# Patient Record
Sex: MALE | Race: BLACK OR AFRICAN AMERICAN | URBAN - METROPOLITAN AREA
[De-identification: names, ages, dates, MRNs, and addresses within clinical notes are randomized per-mention and may not be internally consistent; named-entity substitution may affect disease eponyms.]

---

## 2017-01-02 ENCOUNTER — OFFICE VISIT (OUTPATIENT)
Dept: FAMILY MEDICINE | Facility: CLINIC | Age: 47
End: 2017-01-02
Payer: COMMERCIAL

## 2017-01-02 VITALS
RESPIRATION RATE: 16 BRPM | DIASTOLIC BLOOD PRESSURE: 62 MMHG | SYSTOLIC BLOOD PRESSURE: 102 MMHG | OXYGEN SATURATION: 99 % | TEMPERATURE: 97.1 F | WEIGHT: 181.8 LBS | HEART RATE: 71 BPM | HEIGHT: 69 IN | BODY MASS INDEX: 26.93 KG/M2

## 2017-01-02 DIAGNOSIS — K21.9 GASTROESOPHAGEAL REFLUX DISEASE WITHOUT ESOPHAGITIS: ICD-10-CM

## 2017-01-02 DIAGNOSIS — A04.8 H. PYLORI INFECTION: Primary | ICD-10-CM

## 2017-01-02 PROCEDURE — 99214 OFFICE O/P EST MOD 30 MIN: CPT | Performed by: FAMILY MEDICINE

## 2017-01-02 RX ORDER — AMOXICILLIN 500 MG/1
1000 CAPSULE ORAL 2 TIMES DAILY
Qty: 60 CAPSULE | Refills: 0 | Status: SHIPPED | OUTPATIENT
Start: 2017-01-02 | End: 2018-01-26

## 2017-01-02 RX ORDER — METRONIDAZOLE 500 MG/1
500 TABLET ORAL 2 TIMES DAILY
Qty: 30 TABLET | Refills: 0 | Status: SHIPPED | OUTPATIENT
Start: 2017-01-02

## 2017-01-02 NOTE — PATIENT INSTRUCTIONS
(A04.8) H. pylori infection  (primary encounter diagnosis)  Comment:    Plan: amoxicillin (AMOXIL) 500 MG capsule,   One gram po twice daily         metroNIDAZOLE (FLAGYL) 500 MG tablet one twice daily   TREATMENT PROGNOSIS BENEFITS AND RISKS DISCUSSED   SIDE EFFECTS BENEFITS AND RISKS DISCUSSED    MEDICATION RISKS SIDE EFFECTS BENEFITS AND RISKS DISCUSSED   ANOREXIA NAUSEA VOMITING OR DIARRHEA  AND HIVES   SEVERE STOMACH PAINS              (K21.9) Gastroesophageal reflux disease without esophagitis  Comment:    Plan: omeprazole (PRILOSEC) 20 MG CR capsule,   TWICE DAILY         metroNIDAZOLE (FLAGYL) 500 MG tablet  MAY GO BACK TO RANITIDINE AFTER TREATMENT    ,THAT IS ZANTAC   KIT MONTANEZ JR., MD

## 2017-01-02 NOTE — MR AVS SNAPSHOT
After Visit Summary   1/2/2017    Lai Sims    MRN: [id number]           Patient Information     Date Of Birth          1970        Visit Information        Provider Department      1/2/2017 9:00 AM Kit Montanez MD; [employer] [facility]        Today's Diagnoses     H. pylori infection    -  1     Gastroesophageal reflux disease without esophagitis           Care Instructions    (A04.8) H. pylori infection  (primary encounter diagnosis)  Comment:    Plan: amoxicillin (AMOXIL) 500 MG capsule,   One gram po twice daily         metroNIDAZOLE (FLAGYL) 500 MG tablet one twice daily   TREATMENT PROGNOSIS BENEFITS AND RISKS DISCUSSED   SIDE EFFECTS BENEFITS AND RISKS DISCUSSED    MEDICATION RISKS SIDE EFFECTS BENEFITS AND RISKS DISCUSSED   ANOREXIA NAUSEA VOMITING OR DIARRHEA  AND HIVES   SEVERE STOMACH PAINS              (K21.9) Gastroesophageal reflux disease without esophagitis  Comment:    Plan: omeprazole (PRILOSEC) 20 MG CR capsule,   TWICE DAILY         metroNIDAZOLE (FLAGYL) 500 MG tablet  MAY GO BACK TO RANITIDINE AFTER TREATMENT    ,THAT IS ZANTAC   KIT MONTANEZ JR., MD                        Follow-ups after your visit        Who to contact     If you have questions or need follow up information about today's clinic visit or your schedule please contact [facility] directly at [phone].  Normal or non-critical lab and imaging results will be communicated to you by MyChart, letter or phone within 4 business days after the clinic has received the results. If you do not hear from us within 7 days, please contact the clinic through MyChart or phone. If you have a critical or abnormal lab result, we will notify you by phone as soon as possible.  Submit refill requests through [employer] or call your pharmacy and they will forward the refill request to us. Please allow 3 business days for  "your refill to be completed.          Additional Information About Your Visit        [employer] Information     [employer] lets you send messages to your doctor, view your test results, renew your prescriptions, schedule appointments and more. To sign up, go to [email or web address] . Click on \"Log in\" on the left side of the screen, which will take you to the Welcome page. Then click on \"Sign up Now\" on the right side of the page.     You will be asked to enter the access code listed below, as well as some personal information. Please follow the directions to create your username and password.     Your access code is: [email or web address]  Expires: 2017  9:35 AM     Your access code will  in 90 days. If you need help or a new code, please call your [place] clinic or [phone].        Your Vitals Were     Pulse Temperature Respirations Height BMI (Body Mass Index) Pulse Oximetry    71 97.1  F (36.2  C) (Tympanic) 16 5' 9\" (1.753 m) 26.83 kg/m2 99%       Blood Pressure from Last 3 Encounters:   17 102/62   16 108/64   16 108/62    Weight from Last 3 Encounters:   17 181 lb 12.8 oz (82.464 kg)   16 185 lb 6.4 oz (84.097 kg)   16 177 lb 9.6 oz (80.559 kg)              Today, you had the following     No orders found for display         Today's Medication Changes          These changes are accurate as of: 17  9:35 AM.  If you have any questions, ask your nurse or doctor.               Start taking these medicines.        Dose/Directions    amoxicillin 500 MG capsule   Commonly known as:  AMOXIL   Used for:  H. pylori infection   Started by:  Will Patterson MD        Dose:  1000 mg   Take 2 capsules (1,000 mg) by mouth 2 times daily   Quantity:  60 capsule   Refills:  0       metroNIDAZOLE 500 MG tablet   Commonly known as:  FLAGYL   Used for:  H. pylori infection, Gastroesophageal reflux disease without esophagitis   Started by:  Will Patterson MD        " Dose:  500 mg   Take 1 tablet (500 mg) by mouth 2 times daily   Quantity:  30 tablet   Refills:  0         These medicines have changed or have updated prescriptions.        Dose/Directions    omeprazole 20 MG CR capsule   Commonly known as:  priLOSEC   This may have changed:  when to take this   Used for:  Gastroesophageal reflux disease without esophagitis   Changed by:  Will Patetrson MD        Dose:  20 mg   Take 1 capsule (20 mg) by mouth 2 times daily   Quantity:  30 capsule   Refills:  0            Where to get your medicines      These medications were sent to [id number] - [place], [place] - [address], [place] [address]     Phone:  [phone]    - amoxicillin 500 MG capsule  - metroNIDAZOLE 500 MG tablet  - omeprazole 20 MG CR capsule             Primary Care Provider    None Specified       No primary provider on file.        Thank you!     Thank you for choosing [facility]  for your care. Our goal is always to provide you with excellent care. Hearing back from our patients is one way we can continue to improve our services. Please take a few minutes to complete the written survey that you may receive in the mail after your visit with us. Thank you!             Your Updated Medication List - Protect others around you: Learn how to safely use, store and throw away your medicines at www.disposemymeds.org.          This list is accurate as of: 1/2/17  9:35 AM.  Always use your most recent med list.                   Brand Name Dispense Instructions for use    amoxicillin 500 MG capsule    AMOXIL    60 capsule    Take 2 capsules (1,000 mg) by mouth 2 times daily       cetirizine 10 MG tablet    zyrTEC    30 tablet    Take 1 tablet (10 mg) by mouth every evening       fluocinonide 0.05 % topical gel    LIDEX    60 g    Apply sparingly to affected area twice daily as needed.  Do not apply to face.       metroNIDAZOLE 500 MG  tablet    FLAGYL    30 tablet    Take 1 tablet (500 mg) by mouth 2 times daily       mineral oil-white petrolatum Crea cream     454 g    Apply topically 2 times daily       omeprazole 20 MG CR capsule    priLOSEC    30 capsule    Take 1 capsule (20 mg) by mouth 2 times daily

## 2017-01-02 NOTE — PROGRESS NOTES
SUBJECTIVE:                                                    Lai Sims is a 46 year old male who presents to clinic today for the following health issues:  Health Maintenance Due   Topic Date Due     TETANUS IMMUNIZATION (SYSTEM ASSIGNED)  09/21/1987     INFLUENZA VACCINE (SYSTEM ASSIGNED)  09/01/2016     Health Maintenance reviewed at today's visit patient asked to schedule/complete:   Immunizations:  Patient agrees to schedule    GERD/Heartburn      Duration: couple months    Description (location/character/radiation): daily, burning in chest    Intensity:  moderate    Accompanying signs and symptoms:  food getting stuck: no   nausea/vomiting/blood: YES, nausea  abdominal pain: YES  black/tarry or bloody stools: no, diarrhea for 1 week    History (similar episodes/previous evaluation): yes    Precipitating or alleviating factors:  worse with no particular food or drink.  current NSAID/Aspirin use: no     Therapies tried and outcome: Omeprazole (Prilosec), not helping the last couple weeks     .  Diabetes Follow-up      Patient is checking blood sugars:  Occasional     Diabetic concerns: None     Symptoms of hypoglycemia (low blood sugar): none     Paresthesias (numbness or burning in feet) or sores: No     Date of last diabetic eye exam:  YEARLY RECOMMENDED      Hyperlipidemia Follow-Up      Rate your low fat/cholesterol diet?: good    Taking statin?  Yes, possible muscle aches from statin    Other lipid medications/supplements?:  none     Hypertension Follow-up      Outpatient blood pressures are not being checked.    Low Salt Diet: no added salt         Amount of exercise or physical activity: None    Problems taking medications regularly: No    Medication side effects: no muscle aches  Diet: low salt, low fat/cholesterol and diabetic     .  Current Outpatient Prescriptions   Medication Sig Dispense Refill     amoxicillin (AMOXIL) 500 MG capsule Take 2 capsules (1,000 mg) by mouth 2 times daily 60  "capsule 0     omeprazole (PRILOSEC) 20 MG CR capsule Take 1 capsule (20 mg) by mouth 2 times daily 30 capsule 0     metroNIDAZOLE (FLAGYL) 500 MG tablet Take 1 tablet (500 mg) by mouth 2 times daily 30 tablet 0     fluocinonide (LIDEX) 0.05 % gel Apply sparingly to affected area twice daily as needed.  Do not apply to face. 60 g 0     mineral oil-white petrolatum (MINERIN, EUCERIN) CREA Apply topically 2 times daily 454 g 0     cetirizine (ZYRTEC) 10 MG tablet Take 1 tablet (10 mg) by mouth every evening 30 tablet 1        No Known Allergies    Immunization History   Administered Date(s) Administered     Hepatitis B 05/15/2014     MMR 2014, 2014     Pneumococcal 23 valent 2014     TDAP (BOOSTRIX AGES 10-64) 2014, 2014, 2015     Varicella 2014, 2014         reports that he does not drink alcohol.      reports that he does not use illicit drugs.    family history includes DIABETES in his father; Hypertension in his mother; Liver Disease in his father.    indicated that his mother is . He indicated that his father is .      has no past surgical history on file.     reports that he currently engages in sexual activity and has had female partners.  .  Pediatric History   Patient Guardian Status     Not on file.     Other Topics Concern     Parent/Sibling W/ Cabg, Mi Or Angioplasty Before 65f 55m? No     Social History Narrative         reports that he has quit smoking. His smoking use included Cigarettes. He does not have any smokeless tobacco history on file.    Medical, social, surgical, and family histories reviewed.    /62 mmHg  Pulse 71  Temp(Src) 97.1  F (36.2  C) (Tympanic)  Resp 16  Ht 5' 9\" (1.753 m)  Wt 181 lb 12.8 oz (82.464 kg)  BMI 26.83 kg/m2  SpO2 99%    Body mass index is 26.83 kg/(m^2).      Problem list, Medication list, Allergies, and Medical/Social/Surgical histories reviewed in [facility] and updated as appropriate.  Labs reviewed " in EPIC  Patient Active Problem List   Diagnosis     Scleral hemorrhage, left     Family history of diabetes mellitus     CARDIOVASCULAR SCREENING; LDL GOAL LESS THAN 100     Nonspecific abnormal results of liver function study     H. pylori infection     History reviewed. No pertinent past surgical history.    Social History   Substance Use Topics     Smoking status: Former Smoker     Types: Cigarettes     Smokeless tobacco: Not on file     Alcohol Use: No     Family History   Problem Relation Age of Onset     Hypertension Mother      DIABETES Father      Liver Disease Father          No Known Allergies  Recent Labs   Lab Test  09/19/16   1652   LDL  94   HDL  38*   TRIG  191*   ALT  75*        BP Readings from Last 6 Encounters:   01/02/17 102/62   11/29/16 108/64   09/19/16 108/62       Wt Readings from Last 3 Encounters:   01/02/17 181 lb 12.8 oz (82.464 kg)   11/29/16 185 lb 6.4 oz (84.097 kg)   09/19/16 177 lb 9.6 oz (80.559 kg)         Positive symptoms or findings indicated by bold designation:     ROS: 10 point ROS neg other than the symptoms noted above in the HPI.except  has Scleral hemorrhage, left; Family history of diabetes mellitus; CARDIOVASCULAR SCREENING; LDL GOAL LESS THAN 100; Nonspecific abnormal results of liver function study; and H. pylori infection on his problem list.   Constitutional: The patient denied fatigue, fever, insomnia, night sweats, recent illness and weight loss.  WEIGHT IS STABLE     Eyes: The patient denied blindness, eye pain, eye tearing, photophobia, vision change and visual disturbance. NORMAL VISION     Ears/Nose/Throat/Neck: The patient denied dizziness, facial pain, hearing loss, nasal discharge, oral pain, otalgia, postnasal drip, sinus congestion, sore throat, tinnitus and voice change.   NORMAL HEARING AND BALANCE     Cardiovascular: The patient denied arrhythmia, chest pain/pressure, claudication, edema, exercise intolerance, fatigue, orthopnea, palpitations and  "syncope.  ASYMPTOMATIC     Respiratory: The patient denied asthma, chest congestion, cough, dyspnea on exertion, dyspnea/shortness of breath, hemoptysis, pedal edema, pleuritic pain, productive sputum, snoring and wheezing. NORMAL     Gastrointestinal: The patient denied abdominal pain, anorexia, constipation, diarrhea, dysphagia, gastroesophageal reflux, hematochezia, hemorrhoids, melena, nausea and vomiting . GASTROESOPHAGEAL REFLUX DISEASE WITHOUT CONTROL, ON ZANTAC   EPIGASTRIC AND LEFT UPPER QUADRANT PAIN   POSITIVE H PYLORI TEST NO TREATMENT YET   DID NOT RECEIVE LETTER ACCORDING TO PATIENT     Genitourinary/Nephrology: The patient denied breast complaint, dysuria, nocturia sexual dysfunction, t, urinary frequency, urinary incontinence, urinary urgency    BENIGN PROSTATIC HYPERTROPHY SYMPTOMS NOCTURIA X 2     Musculoskeletal: The patient denied arthralgia(s), back pain, joint complaint, muscle weakness, myalgias, osteoporosis, sciatica, stiffness and swelling.  OSTEOARTHRITIS KNEES AND INTERMITTEN LOWER BACK PAIN     Dermatoligic:: The patient denied acne, dermatitis, ecchymosis, itching, mole change, rash, skin cancer, skin lesion and sores.      Neurologic: The patient denied dizziness, gait abnormality, headache, memory loss, mental status change, paresis, paresthesia, seizure, syncope, tremor and vision change.     Psychiatric: The patient denied anxiety, depression, disturbances of memory, drug abuse, insomnia, mood swings and relationship difficulties.      Endocrine: The patient denied , goiter, obesity, polyuria and thyroid disease.  DIABETES 2 GOAL 8% LDL OR \"BAD\" CHOLESTEROL  GOAL < 100      Hematologic/Lymphatic: The patient denied abnormal bleeding and bruising, abnormal ecchymoses, anemia, lymph node enlargement/mass, petechiae and venous  Thrombosis.      Allergy/Immunology: The patient denied food allergy and  Allergic rhinitis or conjunctivitis.        PE:  /62 mmHg  Pulse 71  Temp(Src) " "97.1  F (36.2  C) (Tympanic)  Resp 16  Ht 5' 9\" (1.753 m)  Wt 181 lb 12.8 oz (82.464 kg)  BMI 26.83 kg/m2  SpO2 99% Body mass index is 26.83 kg/(m^2).    Constitutional: general appearance, well nourished, well developed, in no acute distress, well developed, appears stated age, normal body habitus,      Eyes:; The patient has normal eyelids sclerae and conjunctivae :      Ears/Nose/Throat: external ear, overall: normal appearance; external nose, overall: benign appearance, normal moujth gums and lips  The patient has:  WITHIN NORMAL LIMITS     Neck: thyroid, overall: normal size, normal consistency, nontender,      Respiratory:  palpation of chest, overall: normal excursion,    Clear to percussion and auscultation      Tachypnea   Color      Cardiovascular:  Good color with no peripheral edema    Regular sinus rhythm without murmur. Physiologic heart sounds Heart is unelarged  .   Chest/Breast: normal shape       Abdominal exam,  Liver and spleen are  unenlarged        Tenderness  NORMAL   Scars  NORMAL     Urogenital; no renal, flank or bladder  tenderness;      Lymphatic: neck nodes,     Other notes     Musculoskeletal:  Brief ortho exam normal except:   NORMAL EXCEPT  OSTEOARTHRITIS KNEES    Integument: inspection of skin, no rash, lesions; and, palpation, no induration, no tenderness.      Neurologic mental status, overall: alert and oriented; gait, no ataxia, no unsteadiness; coordination, no tremors; cranial nerves, overall: normal motor, overall: normal bulk, tone.      Psychiatric: orientation/consciousness, overall: oriented to person, place and time; behavior/psychomotor activity, no tics, normal psychomotor activity; mood and affect, overall: normal mood and affect; appearance, overall: well-groomed, good eye contact; speech, overall: normal quality, no aphasia and normal quality, quantity, intact.        ICD-10-CM    1. H. pylori infection A04.8 amoxicillin (AMOXIL) 500 MG capsule     metroNIDAZOLE " (FLAGYL) 500 MG tablet   2. Gastroesophageal reflux disease without esophagitis K21.9 omeprazole (PRILOSEC) 20 MG CR capsule     metroNIDAZOLE (FLAGYL) 500 MG tablet        .    Side effects benefits and risks thoroughly discussed. .he may come in early if unimproved or getting worse          Importance of adhering to regimen discussed and if medications were dispensed, the importance of taking medications discussed and bringing in the medications after every visit for chronic problems         Please drink 2 glasses of water prior to meals and walk 15-30 minutes after meals    I spent  25 MINUTES SPENT  with patient discussing the following issues    The primary encounter diagnosis was H. pylori infection. A diagnosis of Gastroesophageal reflux disease without esophagitis was also pertinent to this visit. over half of which involved counseling and coordination of care.    Patient Instructions   (A04.8) H. pylori infection  (primary encounter diagnosis)  Comment:    Plan: amoxicillin (AMOXIL) 500 MG capsule,   One gram po twice daily         metroNIDAZOLE (FLAGYL) 500 MG tablet one twice daily   TREATMENT PROGNOSIS BENEFITS AND RISKS DISCUSSED   SIDE EFFECTS BENEFITS AND RISKS DISCUSSED    MEDICATION RISKS SIDE EFFECTS BENEFITS AND RISKS DISCUSSED   ANOREXIA NAUSEA VOMITING OR DIARRHEA  AND HIVES   SEVERE STOMACH PAINS              (K21.9) Gastroesophageal reflux disease without esophagitis  Comment:    Plan: omeprazole (PRILOSEC) 20 MG CR capsule,   TWICE DAILY         metroNIDAZOLE (FLAGYL) 500 MG tablet  MAY GO BACK TO RANITIDINE AFTER TREATMENT    ,THAT IS ZANTAC   KIT MONTANEZ JR., MD                      Diet:  MEDITERRANEAN DIET AND DIABETES      Exercise:  RANGE OF MOTION KNEES AND STRENGTHENING AND AEROBIC WITH MEALS  ,THAT IS WALKING   Exercises Range of motion, balance, isometric, and strengthening exercises 30 repetitions twice daily of involved joints      .KIT MONTANEZ MD 1/2/2017 10:29 AM   January 2, 2017

## 2017-01-02 NOTE — NURSING NOTE
"Chief Complaint   Patient presents with     Gastrophageal Reflux       Initial /62 mmHg  Pulse 71  Temp(Src) 97.1  F (36.2  C) (Tympanic)  Resp 16  Ht 5' 9\" (1.753 m)  Wt 181 lb 12.8 oz (82.464 kg)  BMI 26.83 kg/m2  SpO2 99% Estimated body mass index is 26.83 kg/(m^2) as calculated from the following:    Height as of this encounter: 5' 9\" (1.753 m).    Weight as of this encounter: 181 lb 12.8 oz (82.464 kg).  BP completed using cuff size: naty Samuel CMA      "

## 2017-02-06 DIAGNOSIS — A04.8 H. PYLORI INFECTION: Primary | ICD-10-CM

## 2017-02-06 DIAGNOSIS — K21.9 GASTROESOPHAGEAL REFLUX DISEASE WITHOUT ESOPHAGITIS: ICD-10-CM

## 2017-02-06 NOTE — TELEPHONE ENCOUNTER
metroNIDAZOLE (FLAGYL) 500 MG tablet      Last Written Prescription Date: 1/2/17  Last Fill Quantity: 30,  # refills: 0   Last Office Visit with [facility], [facility] or [facility] prescribing provider: 1/2/17

## 2017-02-09 RX ORDER — METRONIDAZOLE 500 MG/1
500 TABLET ORAL 2 TIMES DAILY
Qty: 30 TABLET | Refills: 0 | OUTPATIENT
Start: 2017-02-09

## 2017-02-10 DIAGNOSIS — A04.8 H. PYLORI INFECTION: Primary | ICD-10-CM

## 2017-02-10 NOTE — TELEPHONE ENCOUNTER
amoxicillin (AMOXIL) 500 MG capsule      Last Written Prescription Date:  1/2/17  Last Fill Quantity: 60,   # refills: 0  Last Office Visit with [facility], [facility] or [facility] prescribing provider: 1/2/17  Future Office visit:       Routing refill request to provider for review/approval because:  Drug not on the [facility], [facility] or [facility] refill protocol or controlled substance

## 2017-02-14 RX ORDER — AMOXICILLIN 500 MG/1
1000 CAPSULE ORAL 2 TIMES DAILY
Qty: 60 CAPSULE | Refills: 0 | OUTPATIENT
Start: 2017-02-14

## 2017-02-15 NOTE — TELEPHONE ENCOUNTER
Pharmacy called requesting status of refill request.   Tech was informed of providers message below.   Pt was called and message was left asking he call clinic for appt for Rx.

## 2017-06-20 ENCOUNTER — OFFICE VISIT (OUTPATIENT)
Dept: FAMILY MEDICINE | Facility: CLINIC | Age: 47
End: 2017-06-20
Payer: COMMERCIAL

## 2017-06-20 VITALS
SYSTOLIC BLOOD PRESSURE: 106 MMHG | DIASTOLIC BLOOD PRESSURE: 64 MMHG | OXYGEN SATURATION: 99 % | HEIGHT: 69 IN | WEIGHT: 178.5 LBS | HEART RATE: 68 BPM | BODY MASS INDEX: 26.44 KG/M2 | RESPIRATION RATE: 16 BRPM | TEMPERATURE: 97.5 F

## 2017-06-20 DIAGNOSIS — L28.0 LICHEN SIMPLEX CHRONICUS: ICD-10-CM

## 2017-06-20 DIAGNOSIS — A04.8 H. PYLORI INFECTION: Primary | ICD-10-CM

## 2017-06-20 PROCEDURE — 99213 OFFICE O/P EST LOW 20 MIN: CPT | Performed by: FAMILY MEDICINE

## 2017-06-20 RX ORDER — FLUOCINONIDE GEL 0.5 MG/G
GEL TOPICAL
Qty: 60 G | Refills: 3 | Status: SHIPPED | OUTPATIENT
Start: 2017-06-20

## 2017-06-20 NOTE — PATIENT INSTRUCTIONS
(A04.8) H. pylori infection  (primary encounter diagnosis)  Comment:    Plan: H Pylori antigen, stool, omeprazole (PRILOSEC)         20 MG CR capsule  (A04.8) H. pylori infection  (primary encounter diagnosis)  Comment:    Plan: H Pylori antigen, stool, omeprazole (PRILOSEC)         20 MG CR capsule, DERMATOLOGY REFERRAL             (L28.0) Lichen simplex chronicus  Comment:    Plan: fluocinonide (LIDEX) 0.05 % topical gel,         DERMATOLOGY REFERRAL

## 2017-06-20 NOTE — PROGRESS NOTES
SUBJECTIVE:                                                    Lai Sims is a 46 year old male who presents to clinic today for the following health issues:      GERD/Heartburn      Duration: on going    Description (location/character/radiation): mid chest through esophagis        Intensity:  moderate    Accompanying signs and symptoms:  food getting stuck: YES  nausea/vomiting/blood: YES  abdominal pain: no   black/tarry or bloody stools: YES: black and mucas    History (similar episodes/previous evaluation): ongoing    Precipitating or alleviating factors:  worse with bananas and cold food.  current NSAID/Aspirin use: no     Therapies tried and outcome: none     SCLERAL HEMORRHAGE HAS RESOLVED   HISTORY OF ABNORMAL LIVER FUNCTION TESTS   HISTORY OF H PYLORI INFECTIPON  LICHEN SIMPLEX CHRONICUS SYMPTOMS RECURRED WHEN RAN OUT OF MEDICATIONS   WANTS TO SEE A DERMATOLOGIST         .  Current Outpatient Prescriptions   Medication Sig Dispense Refill     omeprazole (PRILOSEC) 20 MG CR capsule Take 1 capsule (20 mg) by mouth daily 30 capsule 11     fluocinonide (LIDEX) 0.05 % topical gel Apply sparingly to affected area twice daily as needed.  Do not apply to face. 60 g 3     amoxicillin (AMOXIL) 500 MG capsule Take 2 capsules (1,000 mg) by mouth 2 times daily 60 capsule 0     omeprazole (PRILOSEC) 20 MG CR capsule Take 1 capsule (20 mg) by mouth 2 times daily 30 capsule 0     metroNIDAZOLE (FLAGYL) 500 MG tablet Take 1 tablet (500 mg) by mouth 2 times daily 30 tablet 0     mineral oil-white petrolatum (MINERIN, EUCERIN) CREA Apply topically 2 times daily 454 g 0     cetirizine (ZYRTEC) 10 MG tablet Take 1 tablet (10 mg) by mouth every evening 30 tablet 1        No Known Allergies    Immunization History   Administered Date(s) Administered     Hepatitis B 05/15/2014     MMR 03/20/2014, 05/02/2014     Pneumococcal 23 valent 03/20/2014     TDAP Vaccine (Boostrix) 03/20/2014, 05/02/2014, 02/12/2015     Varicella  2014, 2014         reports that he does not drink alcohol.      reports that he does not use illicit drugs.    family history includes DIABETES in his father; Hypertension in his mother; Liver Disease in his father.    indicated that his mother is . He indicated that his father is .      has no past surgical history on file.     reports that he currently engages in sexual activity and has had female partners.  .  Pediatric History   Patient Guardian Status     Not on file.     Other Topics Concern     Parent/Sibling W/ Cabg, Mi Or Angioplasty Before 65f 55m? No     Social History Narrative         reports that he has quit smoking. His smoking use included Cigarettes. He does not have any smokeless tobacco history on file.    Medical, social, surgical, and family histories reviewed.    Problem list, Medication list, Allergies, and Medical/Social/Surgical histories reviewed in [facility] and updated as appropriate.  Labs reviewed in [facility]  Patient Active Problem List   Diagnosis     Scleral hemorrhage, left     Family history of diabetes mellitus     CARDIOVASCULAR SCREENING; LDL GOAL LESS THAN 100     Nonspecific abnormal results of liver function study     H. pylori infection     History reviewed. No pertinent surgical history.    Social History   Substance Use Topics     Smoking status: Former Smoker     Types: Cigarettes     Smokeless tobacco: Not on file     Alcohol use No     Family History   Problem Relation Age of Onset     Hypertension Mother      DIABETES Father      Liver Disease Father          No Known Allergies  Recent Labs   Lab Test  16   1652   LDL  94   HDL  38*   TRIG  191*   ALT  75*        BP Readings from Last 6 Encounters:   17 106/64   17 102/62   16 108/64   16 108/62       Wt Readings from Last 3 Encounters:   17 178 lb 8 oz (81 kg)   17 181 lb 12.8 oz (82.5 kg)   16 185 lb 6.4 oz (84.1 kg)         Positive symptoms or  findings indicated by bold designation:     ROS: 10 point ROS neg other than the symptoms noted above in the HPI.except  has Scleral hemorrhage, left; Family history of diabetes mellitus; CARDIOVASCULAR SCREENING; LDL GOAL LESS THAN 100; Nonspecific abnormal results of liver function study; and H. pylori infection on his problem list.   Constitutional: The patient denied fatigue, fever, insomnia, night sweats, recent illness and weight loss.  WEIGHT STABLE     Eyes: The patient denied blindness, eye pain, eye tearing, photophobia, vision change and visual disturbance. NORMAL       Ears/Nose/Throat/Neck: The patient denied dizziness, facial pain, hearing loss, nasal discharge, oral pain, otalgia, postnasal drip, sinus congestion, sore throat, tinnitus and voice change.   NORMAL     Cardiovascular: The patient denied arrhythmia, chest pain/pressure, claudication, edema, exercise intolerance, fatigue, orthopnea, palpitations and syncope.  NORMAL     Respiratory: The patient denied asthma, chest congestion, cough, dyspnea on exertion, dyspnea/shortness of breath, hemoptysis, pedal edema, pleuritic pain, productive sputum, snoring and wheezing. NORMAL     Gastrointestinal: SEE HISTORY OF PRESENT ILLNESS     Genitourinary/Nephrology: The patient denied breast complaint, dysuria, nocturia sexual dysfunction, t, urinary frequency, urinary incontinence, urinary urgency    NORMAL     Musculoskeletal: The patient denied arthralgia(s), back pain, joint complaint, muscle weakness, myalgias, osteoporosis, sciatica, stiffness and swelling.  NORMAL     Dermatoligic:: The patient denied acne, dermatitis, ecchymosis, itching, mole change, rash, skin cancer, skin lesion and sores.  ITCHY RASH WITH PLAQUES LOWER EXTREMITY BILATERAL      Neurologic: The patient denied dizziness, gait abnormality, headache, memory loss, mental status change, paresis, paresthesia, seizure, syncope, tremor and vision change. NORMAL     Psychiatric: The  "patient denied anxiety, depression, disturbances of memory, drug abuse, insomnia, mood swings and relationship difficulties.  NORMAL     Endocrine: The patient denied , goiter, obesity, polyuria and thyroid disease.  NORMAL     Hematologic/Lymphatic: The patient denied abnormal bleeding and bruising, abnormal ecchymoses, anemia, lymph node enlargement/mass, petechiae and venous  Thrombosis.  NORMAL     Allergy/Immunology: The patient denied food allergy and  Allergic rhinitis or conjunctivitis.  NORMAL       PE:  /64  Pulse 68  Temp 97.5  F (36.4  C) (Tympanic)  Resp 16  Ht 5' 9\" (1.753 m)  Wt 178 lb 8 oz (81 kg)  SpO2 99%  BMI 26.36 kg/m2 Body mass index is 26.36 kg/(m^2).    Constitutional: general appearance, well nourished, well developed, in no acute distress, well developed, appears stated age, normal body habitus,      Eyes:; The patient has normal eyelids sclerae and conjunctivae : WITHIN NORMAL LIMITS      Ears/Nose/Throat: external ear, overall: normal appearance; external nose, overall: benign appearance, normal moujth gums and lips  The patient has:  NORMAL     Neck: thyroid, overall: normal size, normal consistency, nontender,  NORMAL     Respiratory:  palpation of chest, overall: normal excursion,  NORMAL   Clear to percussion and auscultation  NORMAL     Tachypnea  NORMAL  Color  NORMAL     Cardiovascular:  Good color with no peripheral edema  NORMAL   Regular sinus rhythm without murmur. Physiologic heart sounds Heart is unelarged  .   Chest/Breast: normal shape  NORMAL      Abdominal exam,  Liver and spleen are  unenlarged        Tenderness  NORMAL   Scars NORMAL     Urogenital; no renal, flank or bladder  tenderness;  NORMAL     Lymphatic: neck nodes,  NORMAL    Other nodes NOT APPLICABLE     Musculoskeletal:  Brief ortho exam normal except:  WITHIN NORMAL LIMITS  UPPER EXTREMETIES AND LOWER EXTREMITY BILATERAL     Integument: inspection of skin, no rash, lesions; and, palpation, no " induration, no tenderness.  NEURODERMATITIS LOWER EXTREMITY NOTED    Neurologic mental status, overall: alert and oriented; gait, no ataxia, no unsteadiness; coordination, no tremors; cranial nerves, overall: normal motor, overall: normal bulk, tone.  NORMAL     Psychiatric: orientation/consciousness, overall: oriented to person, place and time; behavior/psychomotor activity, no tics, normal psychomotor activity; mood and affect, overall: normal mood and affect; appearance, overall: well-groomed, good eye contact; speech, overall: normal quality, no aphasia and normal quality, quantity, intact.  NORMAL     Diagnostic Test Results:  Results for orders placed or performed in visit on 11/30/16   H Pylori antigen, stool   Result Value Ref Range    Specimen Description Feces     H Pylori Antigen (A)      Positive for Helicobacter pylori antigen by enzyme immunoassay. A positive   result indicates the presence of H. pylori antigen.      Micro Report Status FINAL 12/02/2016          ICD-10-CM    1. H. pylori infection A04.8 H Pylori antigen, stool     omeprazole (PRILOSEC) 20 MG CR capsule     DERMATOLOGY REFERRAL   2. Lichen simplex chronicus L28.0 fluocinonide (LIDEX) 0.05 % topical gel     DERMATOLOGY REFERRAL        .    Side effects benefits and risks thoroughly discussed. .he may come in early if unimproved or getting worse          Importance of adhering to regimen discussed and if medications were dispensed, the importance of taking medications discussed and bringing in the medications after every visit for chronic problems         Please drink 2 glasses of water prior to meals and walk 15-30 minutes after meals    I spent 15 MINUTES   with patient discussing the following issues   The primary encounter diagnosis was H. pylori infection. A diagnosis of Lichen simplex chronicus was also pertinent to this visit. over half of which involved counseling and coordination of care.    Patient Instructions   (A04.8) H.  pylori infection  (primary encounter diagnosis)  Comment:    Plan: H Pylori antigen, stool, omeprazole (PRILOSEC)         20 MG CR capsule  (A04.8) H. pylori infection  (primary encounter diagnosis)  Comment:    Plan: H Pylori antigen, stool, omeprazole (PRILOSEC)         20 MG CR capsule, DERMATOLOGY REFERRAL             (L28.0) Lichen simplex chronicus  Comment:    Plan: fluocinonide (LIDEX) 0.05 % topical gel,         DERMATOLOGY REFERRAL                                     ALL THE ABOVE PROBLEMS ARE STABLE AND MED CHANGES AS NOTED    Diet:  MEDITERRANEAN DIET    UP TO DATE INFORMATION GIVEN ON GASTROESOPHAGEAL REFLUX DISEASE AND H PYLORI GIVEN     Exercise:  NOT APPLICABLE   Exercises Range of motion, balance, isometric, and strengthening exercises 30 repetitions twice daily of involved joints      .KIT MONTANEZ MD 6/20/2017 11:46 AM  June 21, 2017

## 2017-06-20 NOTE — MR AVS SNAPSHOT
After Visit Summary   6/20/2017    Lai Sims    MRN: [id number]           Patient Information     Date Of Birth          1970        Visit Information        Provider Department      6/20/2017 2:00 PM Will Patterson MD; [employer] [facility]        Today's Diagnoses     H. pylori infection    -  1    Lichen simplex chronicus          Care Instructions    (A04.8) H. pylori infection  (primary encounter diagnosis)  Comment:    Plan: H Pylori antigen, stool, omeprazole (PRILOSEC)         20 MG CR capsule  (A04.8) H. pylori infection  (primary encounter diagnosis)  Comment:    Plan: H Pylori antigen, stool, omeprazole (PRILOSEC)         20 MG CR capsule, DERMATOLOGY REFERRAL             (L28.0) Lichen simplex chronicus  Comment:    Plan: fluocinonide (LIDEX) 0.05 % topical gel,         DERMATOLOGY REFERRAL                                       Follow-ups after your visit        Additional Services     DERMATOLOGY REFERRAL       Your provider has referred you to: [facility]. [facility] [phone]   [email or web address]  (A04.8) H. pylori infection  (primary encounter diagnosis)  Comment:     Plan: H Pylori antigen, stool, omeprazole (PRILOSEC)         20 MG CR capsule             (L28.0) Lichen simplex chronicus  Comment:    Plan: fluocinonide (LIDEX) 0.05 % topical gel         TWICE DAILY         Please be aware that coverage of these services is subject to the terms and limitations of your health insurance plan.  Call member services at your health plan with any benefit or coverage questions.      Please bring the following with you to your appointment:    (1) Any X-Rays, CTs or MRIs which have been performed.  Contact the facility where they were done to arrange for  prior to your scheduled appointment.    (2) List of current medications  (3) This referral request   (4) Any  "documents/labs given to you for this referral                  Future tests that were ordered for you today     Open Future Orders        Priority Expected Expires Ordered    H Pylori antigen, stool Routine  2017            Who to contact     If you have questions or need follow up information about today's clinic visit or your schedule please contact [facility] directly at [phone].  Normal or non-critical lab and imaging results will be communicated to you by MyChart, letter or phone within 4 business days after the clinic has received the results. If you do not hear from us within 7 days, please contact the clinic through [employer] or phone. If you have a critical or abnormal lab result, we will notify you by phone as soon as possible.  Submit refill requests through [employer] or call your pharmacy and they will forward the refill request to us. Please allow 3 business days for your refill to be completed.          Additional Information About Your Visit        [employer] Information     [employer] lets you send messages to your doctor, view your test results, renew your prescriptions, schedule appointments and more. To sign up, go to [email or web address] . Click on \"Log in\" on the left side of the screen, which will take you to the Welcome page. Then click on \"Sign up Now\" on the right side of the page.     You will be asked to enter the access code listed below, as well as some personal information. Please follow the directions to create your username and password.     Your access code is: [email or web address]  Expires: 2017  2:45 PM     Your access code will  in 90 days. If you need help or a new code, please call your [facility] clinic or [phone].        Care EveryWhere ID     This is your Care EveryWhere ID. This could be used by other organizations to access your [facility] medical records  [id number]        Your Vitals Were     Pulse Temperature " "Respirations Height Pulse Oximetry BMI (Body Mass Index)    68 97.5  F (36.4  C) (Tympanic) 16 5' 9\" (1.753 m) 99% 26.36 kg/m2       Blood Pressure from Last 3 Encounters:   06/20/17 106/64   01/02/17 102/62   11/29/16 108/64    Weight from Last 3 Encounters:   06/20/17 178 lb 8 oz (81 kg)   01/02/17 181 lb 12.8 oz (82.5 kg)   11/29/16 185 lb 6.4 oz (84.1 kg)              We Performed the Following     DERMATOLOGY REFERRAL          Today's Medication Changes          These changes are accurate as of: 6/20/17  2:45 PM.  If you have any questions, ask your nurse or doctor.               These medicines have changed or have updated prescriptions.        Dose/Directions    * omeprazole 20 MG CR capsule   Commonly known as:  priLOSEC   This may have changed:  Another medication with the same name was added. Make sure you understand how and when to take each.   Used for:  Gastroesophageal reflux disease without esophagitis   Changed by:  Will Patterson MD        Dose:  20 mg   Take 1 capsule (20 mg) by mouth 2 times daily   Quantity:  30 capsule   Refills:  0       * omeprazole 20 MG CR capsule   Commonly known as:  priLOSEC   This may have changed:  You were already taking a medication with the same name, and this prescription was added. Make sure you understand how and when to take each.   Used for:  H. pylori infection   Changed by:  Will Patterson MD        Dose:  20 mg   Take 1 capsule (20 mg) by mouth daily   Quantity:  30 capsule   Refills:  11       * Notice:  This list has 2 medication(s) that are the same as other medications prescribed for you. Read the directions carefully, and ask your doctor or other care provider to review them with you.         Where to get your medicines      These medications were sent to [facility]/pharmacy #[id number] - [place], [place] - [address]  [address], [place] [address]     Phone:  [phone]     fluocinonide 0.05 % topical gel    omeprazole 20 " MG CR capsule                Primary Care Provider    None Specified       No primary provider on file.        Thank you!     Thank you for choosing [facility]  for your care. Our goal is always to provide you with excellent care. Hearing back from our patients is one way we can continue to improve our services. Please take a few minutes to complete the written survey that you may receive in the mail after your visit with us. Thank you!             Your Updated Medication List - Protect others around you: Learn how to safely use, store and throw away your medicines at www.disposemymeds.org.          This list is accurate as of: 6/20/17  2:45 PM.  Always use your most recent med list.                   Brand Name Dispense Instructions for use    amoxicillin 500 MG capsule    AMOXIL    60 capsule    Take 2 capsules (1,000 mg) by mouth 2 times daily       cetirizine 10 MG tablet    zyrTEC    30 tablet    Take 1 tablet (10 mg) by mouth every evening       fluocinonide 0.05 % topical gel    LIDEX    60 g    Apply sparingly to affected area twice daily as needed.  Do not apply to face.       metroNIDAZOLE 500 MG tablet    FLAGYL    30 tablet    Take 1 tablet (500 mg) by mouth 2 times daily       mineral oil-white petrolatum Crea cream     454 g    Apply topically 2 times daily       * omeprazole 20 MG CR capsule    priLOSEC    30 capsule    Take 1 capsule (20 mg) by mouth 2 times daily       * omeprazole 20 MG CR capsule    priLOSEC    30 capsule    Take 1 capsule (20 mg) by mouth daily       * Notice:  This list has 2 medication(s) that are the same as other medications prescribed for you. Read the directions carefully, and ask your doctor or other care provider to review them with you.

## 2017-06-20 NOTE — NURSING NOTE
"Chief Complaint   Patient presents with     Abdominal Pain       Initial /64  Pulse 68  Temp 97.5  F (36.4  C) (Tympanic)  Resp 16  Ht 5' 9\" (1.753 m)  Wt 178 lb 8 oz (81 kg)  SpO2 99%  BMI 26.36 kg/m2 Estimated body mass index is 26.36 kg/(m^2) as calculated from the following:    Height as of this encounter: 5' 9\" (1.753 m).    Weight as of this encounter: 178 lb 8 oz (81 kg).  Medication Reconciliation: complete   .Pedro Pablo MEDEROS      "

## 2017-06-21 DIAGNOSIS — A04.8 H. PYLORI INFECTION: ICD-10-CM

## 2017-06-21 PROBLEM — L28.0 LICHEN SIMPLEX CHRONICUS: Status: ACTIVE | Noted: 2017-06-21

## 2017-06-21 PROCEDURE — 87338 HPYLORI STOOL AG IA: CPT | Performed by: FAMILY MEDICINE

## 2017-06-21 NOTE — LETTER
[address]  [address]  [facility] [address]  [phone]                                                                                                           Lai Sims   [address]  [address]  [place] [address]    June 22, 2017      Dear Lai,    The results of your recent tests were reviewed and are enclosed.     NORMAL FECAL COLORECTAL CANCER SCREEN    Results for orders placed or performed in visit on 06/21/17   H Pylori antigen, stool   Result Value Ref Range    Specimen Description Feces     H Pylori Antigen       Negative for Helicobacter pylori antigen by enzyme immunoassay. A negative   result indicates the absence of H. pylori antigen or that the level of antigen   is below the level of detection.      Micro Report Status FINAL 06/22/2017      Thank you for choosing [facility].  We appreciate the opportunity to serve you and look forward to supporting your healthcare needs in the future.    If you have any questions or concerns, please call me or my staff at [phone].      Sincerely,    Will Patterson Jr MD

## 2017-06-22 LAB
H PYLORI AG STL QL IA: NORMAL
MICRO REPORT STATUS: NORMAL
SPECIMEN SOURCE: NORMAL

## 2017-06-22 NOTE — PROGRESS NOTES
Please send normal lab letter when labs are complete  NORMAL FECAL COLORECTAL CANCER SCREEN   KIT MONTANEZ JR., MD

## 2018-01-26 ENCOUNTER — RADIANT APPOINTMENT (OUTPATIENT)
Dept: GENERAL RADIOLOGY | Facility: CLINIC | Age: 48
End: 2018-01-26
Attending: FAMILY MEDICINE
Payer: COMMERCIAL

## 2018-01-26 ENCOUNTER — OFFICE VISIT (OUTPATIENT)
Dept: FAMILY MEDICINE | Facility: CLINIC | Age: 48
End: 2018-01-26
Payer: COMMERCIAL

## 2018-01-26 ENCOUNTER — APPOINTMENT (OUTPATIENT)
Dept: LAB | Facility: CLINIC | Age: 48
End: 2018-01-26
Payer: COMMERCIAL

## 2018-01-26 VITALS
SYSTOLIC BLOOD PRESSURE: 116 MMHG | HEART RATE: 79 BPM | RESPIRATION RATE: 16 BRPM | DIASTOLIC BLOOD PRESSURE: 76 MMHG | TEMPERATURE: 98.4 F | WEIGHT: 191.5 LBS | OXYGEN SATURATION: 100 % | BODY MASS INDEX: 28.28 KG/M2

## 2018-01-26 DIAGNOSIS — M71.21 SYNOVIAL CYST OF RIGHT POPLITEAL SPACE: ICD-10-CM

## 2018-01-26 DIAGNOSIS — M17.11 PRIMARY OSTEOARTHRITIS OF RIGHT KNEE: ICD-10-CM

## 2018-01-26 DIAGNOSIS — A04.8 H. PYLORI INFECTION: Primary | ICD-10-CM

## 2018-01-26 DIAGNOSIS — M54.16 LUMBAR RADICULOPATHY: ICD-10-CM

## 2018-01-26 PROCEDURE — 99214 OFFICE O/P EST MOD 30 MIN: CPT | Performed by: FAMILY MEDICINE

## 2018-01-26 PROCEDURE — 72100 X-RAY EXAM L-S SPINE 2/3 VWS: CPT | Mod: FY

## 2018-01-26 PROCEDURE — 87338 HPYLORI STOOL AG IA: CPT | Performed by: FAMILY MEDICINE

## 2018-01-26 PROCEDURE — 73564 X-RAY EXAM KNEE 4 OR MORE: CPT | Mod: RT

## 2018-01-26 RX ORDER — ACETAMINOPHEN 500 MG
500-1000 TABLET ORAL EVERY 6 HOURS PRN
Qty: 120 TABLET | Refills: 11 | Status: SHIPPED | OUTPATIENT
Start: 2018-01-26

## 2018-01-26 NOTE — LETTER
January 29, 2018      Lai Sims  [address]  [address]  [facility] [address]        Dear ,    We are writing to inform you of your test results.     NORMAL HELICOBACTER PYLORI ANTIGEN     Resulted Orders   H Pylori antigen, stool   Result Value Ref Range    Specimen Description Feces     H Pylori Antigen       Negative for Helicobacter pylori antigen by enzyme immunoassay. A negative result   indicates the absence of H. pylori antigen or that the level of antigen is below the level   of detection.         If you have any questions or concerns, please call the clinic at the number listed above.       Sincerely,        KIT MONTANEZ MD

## 2018-01-26 NOTE — LETTER
January 30, 2018      Lai Sims  [address]  [address]  [facility] [address]        Dear ,    We are writing to inform you of your test results.    NORMAL HELICOBACTER PYLORI ANTIGEN TEST     Resulted Orders   H Pylori antigen, stool   Result Value Ref Range    Specimen Description Feces     H Pylori Antigen       Negative for Helicobacter pylori antigen by enzyme immunoassay. A negative result   indicates the absence of H. pylori antigen or that the level of antigen is below the level   of detection.         If you have any questions or concerns, please call the clinic at the number listed above.       Sincerely,        KIT MONTANEZ MD

## 2018-01-26 NOTE — LETTER
January 29, 2018      Lai Sims   [address]  [address]  [facility] [address]    Dear ,    We are writing to inform you of your test results.  Results for orders placed or performed in visit on 01/26/18   XR Knee Right G/E 4 Views    Narrative    KNEE RIGHT FOUR OR MORE VIEWS   1/26/2018 12:00 PM     HISTORY: Synovial cyst of right popliteal space. Primary  osteoarthritis of right knee.    COMPARISON: None.      Impression    IMPRESSION: AP and lateral radiographs of each knee.    Sclerotic focus noted at the articular surface of the right medial  femoral condyle, may be a confluence of shadows, though some suspicion  for an osteochondral defect. Patient also has mild joint space  narrowing in both medial and lateral knee compartments and mild knee  effusion. No acute fracture. If pain is long-standing and/or  persistent, may consider MRI examination.    Considerable joint space narrowing in what is labeled as the right  knee, though aligned as if it is the left knee. The AP radiograph at  11:43:54 appears different than AP radiograph performed at 11:40:36.  At 11:43:54, there is considerable joint space narrowing in the  lateral knee compartment and what is oriented to be the left knee.  Increased sclerosis noted at the lateral aspect of the tibia and  tibial plateau which may be degenerative, though suggestive of  fracture, perhaps an old healed fracture, though clinically correlate  for any possibility of an acute fracture.    Current exams are confusing and perhaps mislabeled.    ROSSI CHEN MD               If you have any questions or concerns, please call the clinic at the number listed above.       Sincerely,      Dr Patterson

## 2018-01-26 NOTE — PROGRESS NOTES
SUBJECTIVE:   Lai Sims is a 47 year old male who presents to clinic today for the following health issues:      Musculoskeletal problem/pain      Duration: 2 years    Description  Location: right knee    Intensity:  moderate    Accompanying signs and symptoms: radiation of pain to hip    History  Previous similar problem: YES  Previous evaluation:  orthopedic evaluation, when he first came to the [place]    Precipitating or alleviating factors:  Trauma or overuse: YES- used to play soccer    Aggravating factors include: walking for extended periods    Therapies tried and outcome: nothing    FELL ON ICE SEVERAL YEARS AGO    UNABLE TO STRAIGHT RIGHT LEG    APPARENT CYST BEHIND KNEE    PATELLOFEMORAL PAIN ORGINALLY    NOW WITH LOWER BACK PAIN RIGHT PARASPINOUS    SACROILIAC JOINT AND THIGH       Abdominal Pain      Duration: 4 days    Description (location/character/radiation): lower abdomin       Associated flank pain: None    Intensity:  moderate    Accompanying signs and symptoms:        Fever/Chills: no        Gas/Bloating: no        Nausea/vomitting: no        Diarrhea: loose stools       Dysuria or Hematuria: no     History (previous similar pain/trauma/previous testing): history of H Pylori    Precipitating or alleviating factors:       Pain worse with eating/BM/urination: na       Pain relieved by BM: YES    Therapies tried and outcome: flagyl    LMP:  not applicable    Worried ABOUT RECURRENCE OF H PYLORI    RIGHT LOWER BACK with RIGHT SACROILIAC JOINT     RIGHT LOWER BACK PAIN with RADICULOPATHY     ONSET LAST SEVERAL MONTHS             .  Current Outpatient Prescriptions   Medication Sig Dispense Refill     acetaminophen (MAPAP EXTRA STRENGTH) 500 MG tablet Take 1-2 tablets (500-1,000 mg) by mouth every 6 hours as needed for mild pain 120 tablet 11     omeprazole (PRILOSEC) 20 MG CR capsule Take 1 capsule (20 mg) by mouth daily 30 capsule 11     mineral oil-white petrolatum (MINERIN, EUCERIN) CREA Apply  topically 2 times daily 454 g 0     fluocinonide (LIDEX) 0.05 % topical gel Apply sparingly to affected area twice daily as needed.  Do not apply to face. (Patient not taking: Reported on 2018) 60 g 3     metroNIDAZOLE (FLAGYL) 500 MG tablet Take 1 tablet (500 mg) by mouth 2 times daily (Patient not taking: Reported on 2018) 30 tablet 0     cetirizine (ZYRTEC) 10 MG tablet Take 1 tablet (10 mg) by mouth every evening (Patient not taking: Reported on 2018) 30 tablet 1        No Known Allergies    Immunization History   Administered Date(s) Administered     HepB 05/15/2014     MMR 2014, 2014     Pneumococcal 23 valent 2014     TDAP Vaccine (Boostrix) 2014, 2014, 2015     Varicella 2014, 2014         reports that he does not drink alcohol.      reports that he does not use illicit drugs.    family history includes DIABETES in his father; Hypertension in his mother; Liver Disease in his father.    indicated that his mother is . He indicated that his father is .      has no past surgical history on file.     reports that he currently engages in sexual activity and has had female partners.  .  Pediatric History   Patient Guardian Status     Not on file.     Other Topics Concern     Parent/Sibling W/ Cabg, Mi Or Angioplasty Before 65f 55m? No     Social History Narrative         reports that he has quit smoking. His smoking use included Cigarettes. He has never used smokeless tobacco.    Medical, social, surgical, and family histories reviewed.    Labs reviewed in EPIC  Patient Active Problem List   Diagnosis     Family history of diabetes mellitus     CARDIOVASCULAR SCREENING; LDL GOAL LESS THAN 100     Nonspecific abnormal results of liver function study     H. pylori infection     Lichen simplex chronicus     Primary osteoarthritis of right knee     Synovial cyst of right popliteal space     Lumbar radiculopathy     History reviewed. No  pertinent surgical history.    Social History   Substance Use Topics     Smoking status: Former Smoker     Types: Cigarettes     Smokeless tobacco: Never Used     Alcohol use No     Family History   Problem Relation Age of Onset     Hypertension Mother      DIABETES Father      Liver Disease Father          No Known Allergies  Recent Labs   Lab Test  09/19/16   1652   LDL  94   HDL  38*   TRIG  191*   ALT  75*        BP Readings from Last 6 Encounters:   01/26/18 116/76   06/20/17 106/64   01/02/17 102/62   11/29/16 108/64   09/19/16 108/62       Wt Readings from Last 3 Encounters:   01/26/18 191 lb 8 oz (86.9 kg)   06/20/17 178 lb 8 oz (81 kg)   01/02/17 181 lb 12.8 oz (82.5 kg)         Positive symptoms or findings indicated by bold designation:     ROS: 10 point ROS neg other than the symptoms noted above in the HPI.except  has Family history of diabetes mellitus; CARDIOVASCULAR SCREENING; LDL GOAL LESS THAN 100; Nonspecific abnormal results of liver function study; H. pylori infection; Lichen simplex chronicus; Primary osteoarthritis of right knee; Synovial cyst of right popliteal space; and Lumbar radiculopathy on his problem list.   Constitutional: The patient denied fatigue, fever, insomnia, night sweats, recent illness and weight loss.     Eyes: The patient denied blindness, eye pain, eye tearing, photophobia, vision change and visual disturbance.       Ears/Nose/Throat/Neck: The patient denied dizziness, facial pain, hearing loss, nasal discharge, oral pain, otalgia, postnasal drip, sinus congestion, sore throat, tinnitus and voice change.   NORMAL ENT     Cardiovascular: The patient denied arrhythmia, chest pain/pressure, claudication, edema, exercise intolerance, fatigue, orthopnea, palpitations and syncope.  NORMAL     Respiratory: The patient denied asthma, chest congestion, cough, dyspnea on exertion, dyspnea/shortness of breath, hemoptysis, pedal edema, pleuritic pain, productive sputum, snoring and  wheezing.NL     Gastrointestinal: The patient denied abdominal pain, anorexia, constipation, diarrhea, dysphagia, gastroesophageal reflux, hematochezia, hemorrhoids, melena, nausea and vomiting . GASTROESOPHAGEAL REFLUX DISEASE   H PYLORI     Genitourinary/Nephrology: The patient denied breast complaint, dysuria, nocturia sexual dysfunction, t, urinary frequency, urinary incontinence, urinary urgency        Musculoskeletal: The patient denied arthralgia(s), back pain, joint complaint, muscle weakness, myalgias, osteoporosis, sciatica, stiffness and swelling.  RIGHT MORE THAN     KNEE PAIN     UNABLE TO EXTEND KNEE     FINCH CYST RIGHT KNEE     PATELLOFEMORAL PAIN     LEFT PATELLOFEMORAL PAIN MILD OSTEOARTHRITIS     RIGHT LUMBAR AND PARALUMBAR PAIN     WITH RIGHT OUTER LEG  AND LOWER LEG       Dermatoligic:: The patient denied acne, dermatitis, ecchymosis, itching, mole change, rash, skin cancer, skin lesion and sores.      Neurologic: The patient denied dizziness, gait abnormality, headache, memory loss, mental status change, paresis, paresthesia, seizure, syncope, tremor and vision change.     Psychiatric: The patient denied anxiety, depression, disturbances of memory, drug abuse, insomnia, mood swings and relationship difficulties.      Endocrine: The patient denied , goiter, obesity, polyuria and thyroid disease.      Hematologic/Lymphatic: The patient denied abnormal bleeding and bruising, abnormal ecchymoses, anemia, lymph node enlargement/mass, petechiae and venous  Thrombosis.      Allergy/Immunology: The patient denied food allergy and  Allergic rhinitis or conjunctivitis.        PE:  /76  Pulse 79  Temp 98.4  F (36.9  C) (Tympanic)  Resp 16  Wt 191 lb 8 oz (86.9 kg)  SpO2 100%  BMI 28.28 kg/m2 Body mass index is 28.28 kg/(m^2).    Constitutional: general appearance, well nourished, well developed, in no acute distress, well developed, appears stated age, normal body habitus,      Eyes:; The  patient has normal eyelids sclerae and conjunctivae :      Ears/Nose/Throat: external ear, overall: normal appearance; external nose, overall: benign appearance, normal moujth gums and lips  The patient has:  NORMAL     Neck: thyroid, overall: normal size, normal consistency, nontender,  NORMAL     Respiratory:  palpation of chest, overall: normal excursion, NORMAL   Clear to percussion and auscultation  NORMAL     Tachypnea  NORMAL  Color  NORMAL     Cardiovascular:  Good color with no peripheral edema NORMAL   Regular sinus rhythm without murmur. Physiologic heart sounds Heart is unelarged  .   Chest/Breast: normal shape  NORMAL      Abdominal exam,  Liver and spleen are  unenlarged NORMAL       Tenderness NORMAL   Scars NORMAL     Urogenital; no renal, flank or bladder  tenderness; NORMAL     Lymphatic: neck nodes, NORMAL    Other nodesNL     Musculoskeletal:  Brief ortho exam normal except:     RIGHT PATELLOFEMORAL EXAM UNABLE TO STRAIGHTEN RIGHT KNEE FULLY     POOR SENSE OF BALANCE RIGHT LEG     BAKER CYST BEHIND RIGHT KNEE    MEDIAL MENISCAL NARROWING         POSITIVE STRAIGHT LEG RAISING TEST RIGHT     RIGHT POSTERIOR HIP AND RIGHT THIG PAIN    ANTALGIC GAIT     Integument: inspection of skin, no rash, lesions; and, palpation, no induration, no tenderness.      Neurologic mental status, overall: alert and oriented; gait, no ataxia, no unsteadiness; coordination, no tremors; cranial nerves, overall: normal motor, overall: normal bulk, tone.  NORMAL  EXCEPT POOR BALANCE RIGHT KNEE    Psychiatric: orientation/consciousness, overall: oriented to person, place and time; behavior/psychomotor activity, no tics, normal psychomotor activity; mood and affect, overall: normal mood and affect; appearance, overall: well-groomed, good eye contact; speech, overall: normal quality, no aphasia and normal quality, quantity, intact.      Diagnostic Test Results:  Results for orders placed or performed in visit on 06/21/17   H  Pylori antigen, stool   Result Value Ref Range    Specimen Description Feces     H Pylori Antigen       Negative for Helicobacter pylori antigen by enzyme immunoassay. A negative   result indicates the absence of H. pylori antigen or that the level of antigen   is below the level of detection.      Micro Report Status FINAL 06/22/2017          ICD-10-CM    1. H. pylori infection A04.8 H Pylori antigen, stool     omeprazole (PRILOSEC) 20 MG CR capsule     H Pylori antigen, stool   2. Lumbar radiculopathy M54.16 XR Lumbar Spine 2/3 Views     PHYSICAL THERAPY REFERRAL     ORTHO  REFERRAL     acetaminophen (MAPAP EXTRA STRENGTH) 500 MG tablet   3. Synovial cyst of right popliteal space M71.21 XR Knee Right G/E 4 Views     ORTHO  REFERRAL     acetaminophen (MAPAP EXTRA STRENGTH) 500 MG tablet   4. Primary osteoarthritis of right knee M17.11 XR Knee Right G/E 4 Views     acetaminophen (MAPAP EXTRA STRENGTH) 500 MG tablet        .    Side effects benefits and risks thoroughly discussed. .he may come in early if unimproved or getting worse          Importance of adhering to regimen discussed and if medications were dispensed, the importance of taking medications discussed and bringing in the medications after every visit for chronic problems         Please drink 2 glasses of water prior to meals and walk 15-30 minutes after meals    I spent 25 MINUTES SPENT  with patient discussing the following issues    The primary encounter diagnosis was H. pylori infection. Diagnoses of Lumbar radiculopathy, Synovial cyst of right popliteal space, and Primary osteoarthritis of right knee were also pertinent to this visit. over half of which involved counseling and coordination of care.    Patient Instructions     KNEE EXERCISES        ICE TO KNEE 5 MINUTES PRIOR TO EXERCISE IF POSSIBLE    ISOMETRIC KNEE EXTENDED POSITION STANDING X 30 TWICE DAILY \    FLEXION OF KNEE ISOMETRIC 90 DEGREE FLEXION X 30 TWICE DAILY    WITH  FIVE POUND WEIGHTS OR PURSE    SITTING EXTENDED KNEE  X 3O SECONDS TWICE DAILY    STANDING WITH KNEE FLEXED X 30 SECONDS TWICE DAILY    CLOSED CHAIN EXERCISE  ,THAT IS ONE 1-2 INCH BOOK 30 REPS ON AND OFF THE BOOK OR PLATFORM     AFTER 2 WEEK INCREASE TO 3 INCHES    AFTER 4 WEEKS INCREASE TO 4 INCHES    WALL SITTING 30 SECONDS 45 DEGREES    AFTER 2 WEEKS 30 SECONDS 60 DEGREES    AFTER  4 WEEKS 30 SECONDS 90 DEGREES    BALANCE 30 SECONDS WITH OPPOSITE LEG AT 30 DEGREES AND ARM TO SIDE X 2 WEEKS    BALANCE 30 SECONDS WITH OPPOSITE LEG AT 60 DEGREES AND ARM TO SIDE X 2 WEEKS    REPEAT with OPPOSITE LEGS BALANCING         A        Assessment: Lower back pain        Plan: do these exercises at least twice daily:  Standing or sitting exercise can be done every two hours        Laron' Flexion Versus Alie   Extension Exercises For   Low Back Pain     Examples of Laron' Flexion Exercises  1. Pelvic tilt.  Please press the small of your back against the floor.  Start with 5-10  and increase to 100 count over one month     2. Single Knee to chest. Lie on your back with legs in bent position. Alternate one leg and the other very slowly bringing the knee to chest.  Start with a count of 5-10   Over one month work up to 100    3. Double knee to chest.  Lie on your back with knees in bent position.  Bring both knees to the chest slowly hold for a count of 5-to 10. Over one month work to 100    4. Partial sit-up or crunch.  Lie on your back in bent leg position.  Please bring your body with arms crossed in front    To 30 degrees of flexion. Start with 5-10 over one month work up to 100 or more    5. Sit back.  Please sit on the side of the bed or a stair landing    And lie backwards until the abdominal muscles start to quiver.    Hold for a count of 5-10 and over a month work up to 100.    5. Hamstring stretch.  Please extend your legs while sitting on the floor as tolerated for 5-10 count.  Gradually increase to count of  30 over one month        Alternately find a stair landing or sturdy chair and place heel    In a comfortable level of extension  And stretch one hamstring at a time for 5-10 seconds.  Increase to count of 30 over one month                           Squat.  Stand with legs comfortably apart and lower the body slowly by flexing the knees  for count of 5-10 over one month increase to 30.    Useful for anterior disc protrusion, facette joint arthritis spond-10ylolysis, spondylolisthesis and spinal stenosis        Sandhya method or extension exercises    Useful disc bulging posteriorly    1. Prone pressups  Please lie on your abdomen.     Please do a push with the upper half of your body only.    This should not cause the pain to shoot down your buttock thigh leg or foot    Start with 10 and repeat x 3 one minute apart.    Repeat x 10 every 1-2 hours  Pain tends to increase in the center of back    And leaves buttock thighs legs and foot over time    You may shift your pelvis in opposite direction of buttock and leg pain to achieve even better results    2. Superman with arms extended  Or at the side Simultaneously lift your arms and legs off the floor. Start with 5-10 over one month increase to 100.    3. Cat stretch or cobra Start  As if in extended position of prone pressup but hold the stretch for 5 or 10 count. Over one moth to count of 30.    4.  Extensions can be done in standing position  As well if prone pressup is inconvenient.  Shift your pelvis in opposite direction of limb pain.  Put your hands    Flat on your buttocks and lean backwards without loss of balance.  Count 10 x 3 times then 10 extensions hourly         (A04.8) H. pylori infection  (primary encounter diagnosis)    Comment:      Plan: H Pylori antigen, stool, omeprazole (PRILOSEC)           20 MG CR capsule                   (M54.16) Lumbar radiculopathy    Comment:      Plan: XR Lumbar Spine 2/3 Views, PHYSICAL THERAPY           REFERRAL, ORTHO   REFERRAL,           acetaminophen (MAPAP EXTRA STRENGTH) 500 MG           tablet                   (M71.21) Synovial cyst of right popliteal space    Comment:      Plan: XR Knee Right G/E 4 Views, ORTHO            REFERRAL, acetaminophen (MAPAP EXTRA STRENGTH)           500 MG tablet                   (M17.11) Primary osteoarthritis of right knee    Comment:      Plan: XR Knee Right G/E 4 Views, acetaminophen (MAPAP          EXTRA STRENGTH) 500 MG tablet        KIT MONTANEZ JR., MD                            ALL THE ABOVE PROBLEMS ARE STABLE AND MED CHANGES AS NOTED    Diet: MEDITERRANEAN DIET   Exercise: lower back pain  And knee pain right   Exercises Range of motion, balance, isometric, and strengthening exercises 30 repetitions twice daily of involved joints      .KIT MONTANEZ MD 1/26/2018 12:10 PM  January 26, 2018

## 2018-01-26 NOTE — PATIENT INSTRUCTIONS
KNEE EXERCISES        ICE TO KNEE 5 MINUTES PRIOR TO EXERCISE IF POSSIBLE    ISOMETRIC KNEE EXTENDED POSITION STANDING X 30 TWICE DAILY \    FLEXION OF KNEE ISOMETRIC 90 DEGREE FLEXION X 30 TWICE DAILY    WITH FIVE POUND WEIGHTS OR PURSE    SITTING EXTENDED KNEE  X 3O SECONDS TWICE DAILY    STANDING WITH KNEE FLEXED X 30 SECONDS TWICE DAILY    CLOSED CHAIN EXERCISE  ,THAT IS ONE 1-2 INCH BOOK 30 REPS ON AND OFF THE BOOK OR PLATFORM     AFTER 2 WEEK INCREASE TO 3 INCHES    AFTER 4 WEEKS INCREASE TO 4 INCHES    WALL SITTING 30 SECONDS 45 DEGREES    AFTER 2 WEEKS 30 SECONDS 60 DEGREES    AFTER  4 WEEKS 30 SECONDS 90 DEGREES    BALANCE 30 SECONDS WITH OPPOSITE LEG AT 30 DEGREES AND ARM TO SIDE X 2 WEEKS    BALANCE 30 SECONDS WITH OPPOSITE LEG AT 60 DEGREES AND ARM TO SIDE X 2 WEEKS    REPEAT with OPPOSITE LEGS BALANCING         A        Assessment: Lower back pain        Plan: do these exercises at least twice daily:  Standing or sitting exercise can be done every two hours        Laron' Flexion Versus Alie   Extension Exercises For   Low Back Pain     Examples of Laron' Flexion Exercises  1. Pelvic tilt.  Please press the small of your back against the floor.  Start with 5-10  and increase to 100 count over one month     2. Single Knee to chest. Lie on your back with legs in bent position. Alternate one leg and the other very slowly bringing the knee to chest.  Start with a count of 5-10   Over one month work up to 100    3. Double knee to chest.  Lie on your back with knees in bent position.  Bring both knees to the chest slowly hold for a count of 5-to 10. Over one month work to 100    4. Partial sit-up or crunch.  Lie on your back in bent leg position.  Please bring your body with arms crossed in front    To 30 degrees of flexion. Start with 5-10 over one month work up to 100 or more    5. Sit back.  Please sit on the side of the bed or a stair landing    And lie backwards until the abdominal muscles  Spoke with pt's care giver and he understood pt's appt date and time also the provider the pt will see   start to quiver.    Hold for a count of 5-10 and over a month work up to 100.    5. Hamstring stretch.  Please extend your legs while sitting on the floor as tolerated for 5-10 count.  Gradually increase to count of 30 over one month        Alternately find a stair landing or sturdy chair and place heel    In a comfortable level of extension  And stretch one hamstring at a time for 5-10 seconds.  Increase to count of 30 over one month                           Squat.  Stand with legs comfortably apart and lower the body slowly by flexing the knees  for count of 5-10 over one month increase to 30.    Useful for anterior disc protrusion, facette joint arthritis spond-10ylolysis, spondylolisthesis and spinal stenosis        Sandhya method or extension exercises    Useful disc bulging posteriorly    1. Prone pressups  Please lie on your abdomen.     Please do a push with the upper half of your body only.    This should not cause the pain to shoot down your buttock thigh leg or foot    Start with 10 and repeat x 3 one minute apart.    Repeat x 10 every 1-2 hours  Pain tends to increase in the center of back    And leaves buttock thighs legs and foot over time    You may shift your pelvis in opposite direction of buttock and leg pain to achieve even better results    2. Superman with arms extended  Or at the side Simultaneously lift your arms and legs off the floor. Start with 5-10 over one month increase to 100.    3. Cat stretch or cobra Start  As if in extended position of prone pressup but hold the stretch for 5 or 10 count. Over one moth to count of 30.    4.  Extensions can be done in standing position  As well if prone pressup is inconvenient.  Shift your pelvis in opposite direction of limb pain.  Put your hands    Flat on your buttocks and lean backwards without loss of balance.  Count 10 x 3 times then 10 extensions hourly         (A04.8) H. pylori infection  (primary encounter diagnosis)    Comment:      Plan:  H Pylori antigen, stool, omeprazole (PRILOSEC)           20 MG CR capsule                   (M54.16) Lumbar radiculopathy    Comment:      Plan: XR Lumbar Spine 2/3 Views, PHYSICAL THERAPY           REFERRAL, ORTHO  REFERRAL,           acetaminophen (MAPAP EXTRA STRENGTH) 500 MG           tablet                   (M71.21) Synovial cyst of right popliteal space    Comment:      Plan: XR Knee Right G/E 4 Views, ORTHO            REFERRAL, acetaminophen (MAPAP EXTRA STRENGTH)           500 MG tablet                   (M17.11) Primary osteoarthritis of right knee    Comment:      Plan: XR Knee Right G/E 4 Views, acetaminophen (MAPAP          EXTRA STRENGTH) 500 MG tablet        KIT MONTANEZ JR., MD

## 2018-01-26 NOTE — NURSING NOTE
"Chief Complaint   Patient presents with     Musculoskeletal Problem     right knee     Abdominal Pain       Initial /76  Pulse 79  Temp 98.4  F (36.9  C) (Tympanic)  Resp 16  Wt 191 lb 8 oz (86.9 kg)  SpO2 100%  BMI 28.28 kg/m2 Estimated body mass index is 28.28 kg/(m^2) as calculated from the following:    Height as of 6/20/17: 5' 9\" (1.753 m).    Weight as of this encounter: 191 lb 8 oz (86.9 kg).  Medication Reconciliation: complete   Evelia Samuel CMA    "

## 2018-01-26 NOTE — LETTER
January 26, 2018      Lai Sims   [address]  [id number]  [facility] [address]        Dear ,    We are writing to inform you of your test results.    ABNORMAL XRAY KEEP APPOINTMENT WITH ORTHOPEDIST AND PHYSICAL THERAPY     Resulted Orders   XR Knee Right G/E 4 Views    Narrative    KNEE RIGHT FOUR OR MORE VIEWS   1/26/2018 12:00 PM     HISTORY: Synovial cyst of right popliteal space. Primary  osteoarthritis of right knee.    COMPARISON: None.      Impression    IMPRESSION: AP and lateral radiographs of each knee.    Sclerotic focus noted at the articular surface of the right medial  femoral condyle, may be a confluence of shadows, though some suspicion  for an osteochondral defect. Patient also has mild joint space  narrowing in both medial and lateral knee compartments and mild knee  effusion. No acute fracture. If pain is long-standing and/or  persistent, may consider MRI examination.    Considerable joint space narrowing in what is labeled as the right  knee, though aligned as if it is the left knee. The AP radiograph at  11:43:54 appears different than AP radiograph performed at 11:40:36.  At 11:43:54, there is considerable joint space narrowing in the  lateral knee compartment and what is oriented to be the left knee.  Increased sclerosis noted at the lateral aspect of the tibia and  tibial plateau which may be degenerative, though suggestive of  fracture, perhaps an old healed fracture, though clinically correlate  for any possibility of an acute fracture.    Current exams are confusing and perhaps mislabeled.    ROSSI CHEN MD       If you have any questions or concerns, please call the clinic at the number listed above.       Sincerely,    KIT MONTANEZ JR., MD     [facility] XRAY ROOM 1

## 2018-01-26 NOTE — MR AVS SNAPSHOT
After Visit Summary   1/26/2018    Lai Sims    MRN: [id number]           Patient Information     Date Of Birth          1970        Visit Information        Provider Department      1/26/2018 10:30 AM Will Patterson MD; [employer] [facility]        Today's Diagnoses     H. pylori infection    -  1    Lumbar radiculopathy        Synovial cyst of right popliteal space        Primary osteoarthritis of right knee          Care Instructions      KNEE EXERCISES        ICE TO KNEE 5 MINUTES PRIOR TO EXERCISE IF POSSIBLE    ISOMETRIC KNEE EXTENDED POSITION STANDING X 30 TWICE DAILY \    FLEXION OF KNEE ISOMETRIC 90 DEGREE FLEXION X 30 TWICE DAILY    WITH FIVE POUND WEIGHTS OR PURSE    SITTING EXTENDED KNEE  X 3O SECONDS TWICE DAILY    STANDING WITH KNEE FLEXED X 30 SECONDS TWICE DAILY    CLOSED CHAIN EXERCISE  ,THAT IS ONE 1-2 INCH BOOK 30 REPS ON AND OFF THE BOOK OR PLATFORM     AFTER 2 WEEK INCREASE TO 3 INCHES    AFTER 4 WEEKS INCREASE TO 4 INCHES    WALL SITTING 30 SECONDS 45 DEGREES    AFTER 2 WEEKS 30 SECONDS 60 DEGREES    AFTER  4 WEEKS 30 SECONDS 90 DEGREES    BALANCE 30 SECONDS WITH OPPOSITE LEG AT 30 DEGREES AND ARM TO SIDE X 2 WEEKS    BALANCE 30 SECONDS WITH OPPOSITE LEG AT 60 DEGREES AND ARM TO SIDE X 2 WEEKS    REPEAT with OPPOSITE LEGS BALANCING       A    Assessment: Lower back pain    Plan: do these exercises at least twice daily:  Standing or sitting exercise can be done every two hours    Laron' Flexion Versus Aile   Extension Exercises For   Low Back Pain   Examples of Laron' Flexion Exercises  1. Pelvic tilt.  Please press the small of your back against the floor.  Start with 5-10  and increase to 100 count over one month   2. Single Knee to chest. Lie on your back with legs in bent position. Alternate one leg and the other very slowly bringing the knee to chest.  Start with a count of 5-10   Over one  month work up to 100  3. Double knee to chest.  Lie on your back with knees in bent position.  Bring both knees to the chest slowly hold for a count of 5-to 10. Over one month work to 100  4. Partial sit-up or crunch.  Lie on your back in bent leg position.  Please bring your body with arms crossed in front  To 30 degrees of flexion. Start with 5-10 over one month work up to 100 or more  5. Sit back.  Please sit on the side of the bed or a stair landing  And lie backwards until the abdominal muscles start to quiver.  Hold for a count of 5-10 and over a month work up to 100.  5. Hamstring stretch.  Please extend your legs while sitting on the floor as tolerated for 5-10 count.  Gradually increase to count of 30 over one month      Alternately find a stair landing or sturdy chair and place heel  In a comfortable level of extension  And stretch one hamstring at a time for 5-10 seconds.  Increase to count of 30 over one month                         Squat.  Stand with legs comfortably apart and lower the body slowly by flexing the knees  for count of 5-10 over one month increase to 30.  Useful for anterior disc protrusion, facette joint arthritis spond-10ylolysis, spondylolisthesis and spinal stenosis    Sandhya method or extension exercises  Useful disc bulging posteriorly  1. Prone pressups  Please lie on your abdomen.   Please do a push with the upper half of your body only.  This should not cause the pain to shoot down your buttock thigh leg or foot  Start with 10 and repeat x 3 one minute apart.  Repeat x 10 every 1-2 hours  Pain tends to increase in the center of back  And leaves buttock thighs legs and foot over time  You may shift your pelvis in opposite direction of buttock and leg pain to achieve even better results  2. Superman with arms extended  Or at the side Simultaneously lift your arms and legs off the floor. Start with 5-10 over one month increase to 100.  3. Cat stretch or cobra Start  As if in  extended position of prone pressup but hold the stretch for 5 or 10 count. Over one moth to count of 30.  4.  Extensions can be done in standing position  As well if prone pressup is inconvenient.  Shift your pelvis in opposite direction of limb pain.  Put your hands  Flat on your buttocks and lean backwards without loss of balance.  Count 10 x 3 times then 10 extensions hourly               Follow-ups after your visit        Additional Services     ORTHO  REFERRAL       [facility] is referring you to the Orthopedic  Services at [facility].       The  Representative will assist you in the coordination of your Orthopedic and Musculoskeletal Care as prescribed by your physician.    The  Representative will call you within 1 business day to help schedule your appointment, or you may contact the  Representative at:    All areas ~ [phone]         (M54.16) Lumbar radiculopathy  Plan: XR Lumbar Spine 2/3 Views RIGHT LEG RADICULOPATHY     (M71.21) Synovial cyst of right popliteal space  Plan: XR Knee Right G/E 4 Views    (M17.11) Primary osteoarthritis of right knee  Plan: XR Knee Right G/E 4 Views  2 x per week  4 weeks   Unable to extend right knee  OSTEOARTHRITIS RIGHT KNEE XRAY AND BAKER'S CYST  CHRONIC LOWER BACK PAIN RADICULOPATHY RIGHT SIDED   BUTTOCK RIGHT LUMBAR      Timeframe requested:  ONE WEEK  APPOINTMENT PLEASE     Coverage of these services is subject to the terms and limitations of your health insurance plan.  Please call member services at your health plan with any benefit or coverage questions.      If X-rays, CT or MRI's have been performed, please contact the facility where they were done to arrange for , prior to your scheduled appointment.  Please bring this referral request to your appointment and present it to your specialist.            PHYSICAL THERAPY REFERRAL       *This therapy referral will be  "filtered to a centralized scheduling office at [facility] and the patient will receive a call to schedule an appointment at a [facility] location most convenient for them. *     [facility]  [address] [place] [address] [phone]      (M54.16) Lumbar radiculopathy  Plan: XR Lumbar Spine 2/3 Views    (M71.21) Synovial cyst of right popliteal space  Plan: XR Knee Right G/E 4 Views    (M17.11) Primary osteoarthritis of right knee  Plan: XR Knee Right G/E 4 Views  2 x per week  4 weeks   Unable to extend right knee  OSTEOARTHRITIS RIGHT KNEE XRAY AND BAKER'S CYST  CHRONIC LOWER BACK PAIN RADICULOPATHY RIGHT SIDED   BUTTOCK RIGHT LUMBAR           Treatment: Evaluation & Treatment  Special Instructions/Modalities: .  Special Programs:  EVALUATE AND TREAT AS INDICATED   REFERRAL MADE TO ORTHOPEDICS     Please be aware that coverage of these services is subject to the terms and limitations of your health insurance plan.  Call member services at your health plan with any benefit or coverage questions.      **Note to Provider:  If you are referring outside of [facility] for the therapy appointment, please list the name of the location in the \"special instructions\" above, print the referral and give to the patient to schedule the appointment.                  Follow-up notes from your care team     Return in about 4 weeks (around 2/23/2018).      Future tests that were ordered for you today     Open Future Orders        Priority Expected Expires Ordered    H Pylori antigen, stool Routine  2/25/2018 1/26/2018            Who to contact     If you have questions or need follow up information about today's clinic visit or your schedule please contact [facility] directly at [phone].  Normal or non-critical lab and imaging results will be communicated to you by MyChart, letter or phone within 4 business days after the clinic has received the " "results. If you do not hear from us within 7 days, please contact the clinic through [employer] or phone. If you have a critical or abnormal lab result, we will notify you by phone as soon as possible.  Submit refill requests through [employer] or call your pharmacy and they will forward the refill request to us. Please allow 3 business days for your refill to be completed.          Additional Information About Your Visit        Rating[employer] Information     [employer] lets you send messages to your doctor, view your test results, renew your prescriptions, schedule appointments and more. To sign up, go to [email or web address] . Click on \"Log in\" on the left side of the screen, which will take you to the Welcome page. Then click on \"Sign up Now\" on the right side of the page.     You will be asked to enter the access code listed below, as well as some personal information. Please follow the directions to create your username and password.     Your access code is: [email or web address]  Expires: 2018 12:11 PM     Your access code will  in 90 days. If you need help or a new code, please call your [place] clinic or [phone].        Care EveryWhere ID     This is your Care EveryWhere ID. This could be used by other organizations to access your [place] [facility] medical records  [id number]        Your Vitals Were     Pulse Temperature Respirations Pulse Oximetry BMI (Body Mass Index)       79 98.4  F (36.9  C) (Tympanic) 16 100% 28.28 kg/m2        Blood Pressure from Last 3 Encounters:   18 116/76   17 106/64   17 102/62    Weight from Last 3 Encounters:   18 191 lb 8 oz (86.9 kg)   17 178 lb 8 oz (81 kg)   17 181 lb 12.8 oz (82.5 kg)              We Performed the Following     ORTHO  REFERRAL     PHYSICAL THERAPY REFERRAL          Today's Medication Changes          These changes are accurate as of 18 12:11 PM.  If you have any questions, ask your nurse or doctor.               Start " taking these medicines.        Dose/Directions    acetaminophen 500 MG tablet   Commonly known as:  MAPAP EXTRA STRENGTH   Used for:  Lumbar radiculopathy, Synovial cyst of right popliteal space, Primary osteoarthritis of right knee   Started by:  Will Patterson MD        Dose:  500-1000 mg   Take 1-2 tablets (500-1,000 mg) by mouth every 6 hours as needed for mild pain   Quantity:  120 tablet   Refills:  11            Where to get your medicines      These medications were sent to [facility]pharm[facility] #[id number] - [place], [place] - [address], [place] [address]     Phone:  [phone]     acetaminophen 500 MG tablet    omeprazole 20 MG CR capsule                Primary Care Provider Office Phone # Fax #    Will Patterson [phone] [address] [facility][1] [id number]        Equal Access to Services     [facility]: Hadii melanie spencer hadasho Soosiel, waaxda luqadaha, qaybta kaalmada adeegyada, alvaerz guillen . So [facility] [phone].    ATENCIÓN: Si habla español, tiene a chavira disposición servicios gratuitos de asistencia lingüística. Ll[facility] [phone].    We comply with applicable federal civil rights laws and [place] laws. We do not discriminate on the basis of race, color, national origin, age, disability, sex, sexual orientation, or gender identity.            Thank you!     Thank you for choosing [facility]  for your care. Our goal is always to provide you with excellent care. Hearing back from our patients is one way we can continue to improve our services. Please take a few minutes to complete the written survey that you may receive in the mail after your visit with us. Thank you!             Your Updated Medication List - Protect others around you: Learn how to safely use, store and throw away your medicines at www.disposemymeds.org.          This list is accurate as of 1/26/18  12:11 PM.  Always use your most recent med list.                   Brand Name Dispense Instructions for use Diagnosis    acetaminophen 500 MG tablet    MAPAP EXTRA STRENGTH    120 tablet    Take 1-2 tablets (500-1,000 mg) by mouth every 6 hours as needed for mild pain    Lumbar radiculopathy, Synovial cyst of right popliteal space, Primary osteoarthritis of right knee       cetirizine 10 MG tablet    zyrTEC    30 tablet    Take 1 tablet (10 mg) by mouth every evening    Lichen simplex chronicus       fluocinonide 0.05 % topical gel    LIDEX    60 g    Apply sparingly to affected area twice daily as needed.  Do not apply to face.    Lichen simplex chronicus       metroNIDAZOLE 500 MG tablet    FLAGYL    30 tablet    Take 1 tablet (500 mg) by mouth 2 times daily    H. pylori infection, Gastroesophageal reflux disease without esophagitis       mineral oil-white petrolatum Crea cream     454 g    Apply topically 2 times daily    Lichen simplex chronicus       omeprazole 20 MG CR capsule    priLOSEC    30 capsule    Take 1 capsule (20 mg) by mouth daily    H. pylori infection

## 2018-01-29 LAB
H PYLORI AG STL QL IA: NORMAL
SPECIMEN SOURCE: NORMAL

## 2018-01-30 ENCOUNTER — RADIANT APPOINTMENT (OUTPATIENT)
Dept: MRI IMAGING | Facility: CLINIC | Age: 48
End: 2018-01-30
Attending: FAMILY MEDICINE
Payer: COMMERCIAL

## 2018-01-30 ENCOUNTER — OFFICE VISIT (OUTPATIENT)
Dept: ORTHOPEDICS | Facility: CLINIC | Age: 48
End: 2018-01-30
Payer: COMMERCIAL

## 2018-01-30 VITALS — RESPIRATION RATE: 16 BRPM | BODY MASS INDEX: 28.29 KG/M2 | WEIGHT: 191 LBS | HEIGHT: 69 IN

## 2018-01-30 DIAGNOSIS — M93.261 OSTEOCHONDRITIS DISSECANS OF KNEE, RIGHT: ICD-10-CM

## 2018-01-30 DIAGNOSIS — M24.561 KNEE JOINT CONTRACTURE, RIGHT: ICD-10-CM

## 2018-01-30 DIAGNOSIS — M93.261 OSTEOCHONDRITIS DISSECANS OF KNEE, RIGHT: Primary | ICD-10-CM

## 2018-01-30 DIAGNOSIS — M17.11 OSTEOARTHRITIS OF RIGHT KNEE, UNSPECIFIED OSTEOARTHRITIS TYPE: ICD-10-CM

## 2018-01-30 NOTE — PROGRESS NOTES
"Sports Medicine Clinic Visit    PCP: Will Patterson ZACHARY Sims is a 47 year old male who is seen  in consultation at the request of Dr. Patterson presenting with right knee. Reports limited knee ROM. Tried PT for knee without improvement. Was looked at by  Soni in [place] about 2 years ago.    Injury: None    Location of Pain: right posterior knee, suprapatella  Duration of Pain: 2 year(s);   Pain is better with: Sitting  Pain is worse with: Extension and flexion of knee, walking, standing  Additional Features: None  Treatment so far consists of: Physical Therapy  Prior History of related problems: None    Resp 16  Ht 5' 9\" (1.753 m)  Wt 191 lb (86.6 kg)  BMI 28.21 kg/m2         PMH:  Active problem list:  Patient Active Problem List   Diagnosis     Family history of diabetes mellitus     CARDIOVASCULAR SCREENING; LDL GOAL LESS THAN 100     Nonspecific abnormal results of liver function study     H. pylori infection     Lichen simplex chronicus     Primary osteoarthritis of right knee     Synovial cyst of right popliteal space     Lumbar radiculopathy       FH:  Family History   Problem Relation Age of Onset     Hypertension Mother      DIABETES Father      Liver Disease Father        SH:  Social History     Social History     Marital status:      Spouse name: N/A     Number of children: N/A     Years of education: N/A     Occupational History     Not on file.     Social History Main Topics     Smoking status: Current Every Day Smoker     Types: Cigarettes     Smokeless tobacco: Never Used     Alcohol use No     Drug use: No     Sexual activity: Yes     Partners: Female     Other Topics Concern     Parent/Sibling W/ Cabg, Mi Or Angioplasty Before 65f 55m? No     Social History Narrative       MEDS:  See EMR, reviewed  ALL:  See EMR, reviewed    REVIEW OF SYSTEMS:  CONSTITUTIONAL:NEGATIVE for fever, chills, change in weight  INTEGUMENTARY/SKIN: NEGATIVE for worrisome rashes, moles or " lesions  EYES: NEGATIVE for vision changes or irritation  ENT/MOUTH: NEGATIVE for ear, mouth and throat problems  RESP:NEGATIVE for significant cough or SOB  BREAST: NEGATIVE for masses, tenderness or discharge  CV: NEGATIVE for chest pain, palpitations or peripheral edema  GI: NEGATIVE for nausea, abdominal pain, heartburn, or change in bowel habits  :NEGATIVE for frequency, dysuria, or hematuria  :NEGATIVE for frequency, dysuria, or hematuria  NEURO: NEGATIVE for weakness, dizziness or paresthesias  ENDOCRINE: NEGATIVE for temperature intolerance, skin/hair changes  HEME/ALLERGY/IMMUNE: NEGATIVE for bleeding problems  PSYCHIATRIC: NEGATIVE for changes in mood or affect       subjective: This 47-year-old male for the last year has been unable to extend his right knee completely. He works as a  and is still able to drive but it makes him limp and is difficult to walk. He is used to having intermittent pain involving the knee in the past and at times were would swell and catch. But for the last year he's been unable to extend it completely. Growing up he played soccer. He remembers one significant soccer injury where his knee was quite swollen after the injury and he needed to wrap it because of the amount of swelling. He was eventually able to return to soccer.        Objective: He lacks full extension of the knee by about 10  compared to the opposite side. His opposite knee is able to achieve full extension. There is no intra-articular effusion but he seems to have some swelling in the popliteal space that is nontender and not warm to the touch. No signs of cellulitis. He's tender along the lateral joint line mildly tender along the medial joint line. Anterior posterior drawer is negative. Lachman's appears negative. His full range of motion at the hip. Nontender at the distal IT band or fibular head. Distal pulses and sensation are intact. Overlying skin is intact. Appropriate in conversation and  affect.    An x-ray of the right knee show significant degenerative joint disease in both the medial and lateral tibiofemoral spaces. Peripheral spurring is noted. I do not see any significant posterior spurring.    Assessment right knee degenerative joint disease with possible osteochondral defect or loose body    Plan: His x-rays do suggest the possibility of a previous osteochondral lesion. He may be dealing with a current loose body. An MRI is pending to rule out loose body. Follow-up with me to go over the MRI. He's never had an injection in his knee. He tried some physical therapy and did not find it helpful. He's had no other interventions. Follow-up after the MRI.

## 2018-01-30 NOTE — LETTER
"  1/30/2018      RE: Lai Sims  [address] [address]  [facility] [address]       Sports Medicine Clinic Visit    PCP: Will Patterson    Lai Sims is a 47 year old male who is seen  in consultation at the request of Dr. Patterson presenting with right knee. Reports limited knee ROM. Tried PT for knee without improvement. Was looked at by  Soni in [place] about 2 years ago.    Injury: None    Location of Pain: right posterior knee, suprapatella  Duration of Pain: 2 year(s);   Pain is better with: Sitting  Pain is worse with: Extension and flexion of knee, walking, standing  Additional Features: None  Treatment so far consists of: Physical Therapy  Prior History of related problems: None    Resp 16  Ht 5' 9\" (1.753 m)  Wt 191 lb (86.6 kg)  BMI 28.21 kg/m2         PMH:  Active problem list:  Patient Active Problem List   Diagnosis     Family history of diabetes mellitus     CARDIOVASCULAR SCREENING; LDL GOAL LESS THAN 100     Nonspecific abnormal results of liver function study     H. pylori infection     Lichen simplex chronicus     Primary osteoarthritis of right knee     Synovial cyst of right popliteal space     Lumbar radiculopathy       FH:  Family History   Problem Relation Age of Onset     Hypertension Mother      DIABETES Father      Liver Disease Father        SH:  Social History     Social History     Marital status:      Spouse name: N/A     Number of children: N/A     Years of education: N/A     Occupational History     Not on file.     Social History Main Topics     Smoking status: Current Every Day Smoker     Types: Cigarettes     Smokeless tobacco: Never Used     Alcohol use No     Drug use: No     Sexual activity: Yes     Partners: Female     Other Topics Concern     Parent/Sibling W/ Cabg, Mi Or Angioplasty Before 65f 55m? No     Social History Narrative       MEDS:  See EMR, reviewed  ALL:  See EMR, reviewed    REVIEW OF SYSTEMS:  CONSTITUTIONAL:NEGATIVE for fever, chills, " change in weight  INTEGUMENTARY/SKIN: NEGATIVE for worrisome rashes, moles or lesions  EYES: NEGATIVE for vision changes or irritation  ENT/MOUTH: NEGATIVE for ear, mouth and throat problems  RESP:NEGATIVE for significant cough or SOB  BREAST: NEGATIVE for masses, tenderness or discharge  CV: NEGATIVE for chest pain, palpitations or peripheral edema  GI: NEGATIVE for nausea, abdominal pain, heartburn, or change in bowel habits  :NEGATIVE for frequency, dysuria, or hematuria  :NEGATIVE for frequency, dysuria, or hematuria  NEURO: NEGATIVE for weakness, dizziness or paresthesias  ENDOCRINE: NEGATIVE for temperature intolerance, skin/hair changes  HEME/ALLERGY/IMMUNE: NEGATIVE for bleeding problems  PSYCHIATRIC: NEGATIVE for changes in mood or affect       subjective: This 47-year-old male for the last year has been unable to extend his right knee completely. He works as a  and is still able to drive but it makes him limp and is difficult to walk. He is used to having intermittent pain involving the knee in the past and at times were would swell and catch. But for the last year he's been unable to extend it completely. Growing up he played soccer. He remembers one significant soccer injury where his knee was quite swollen after the injury and he needed to wrap it because of the amount of swelling. He was eventually able to return to soccer.        Objective: He lacks full extension of the knee by about 10  compared to the opposite side. His opposite knee is able to achieve full extension. There is no intra-articular effusion but he seems to have some swelling in the popliteal space that is nontender and not warm to the touch. No signs of cellulitis. He's tender along the lateral joint line mildly tender along the medial joint line. Anterior posterior drawer is negative. Lachman's appears negative. His full range of motion at the hip. Nontender at the distal IT band or fibular head. Distal pulses and  sensation are intact. Overlying skin is intact. Appropriate in conversation and affect.    An x-ray of the right knee show significant degenerative joint disease in both the medial and lateral tibiofemoral spaces. Peripheral spurring is noted. I do not see any significant posterior spurring.    Assessment right knee degenerative joint disease with possible osteochondral defect or loose body    Plan: His x-rays do suggest the possibility of a previous osteochondral lesion. He may be dealing with a current loose body. An MRI is pending to rule out loose body. Follow-up with me to go over the MRI. He's never had an injection in his knee. He tried some physical therapy and did not find it helpful. He's had no other interventions. Follow-up after the MRI.        Dillan Monte MD

## 2018-01-30 NOTE — Clinical Note
Thank you for allowing me to see your patient in Sports Medicine Clinic.  Please see the attached copy of our visit.  Sincerely,  Dillan Monte MD

## 2018-02-01 ENCOUNTER — OFFICE VISIT (OUTPATIENT)
Dept: ORTHOPEDICS | Facility: CLINIC | Age: 48
End: 2018-02-01
Payer: COMMERCIAL

## 2018-02-01 VITALS — HEIGHT: 69 IN | RESPIRATION RATE: 16 BRPM | WEIGHT: 191 LBS | BODY MASS INDEX: 28.29 KG/M2

## 2018-02-01 DIAGNOSIS — M23.41 BODY, LOOSE, KNEE, RIGHT: Primary | ICD-10-CM

## 2018-02-01 DIAGNOSIS — M17.11 PRIMARY OSTEOARTHRITIS OF RIGHT KNEE: ICD-10-CM

## 2018-02-01 NOTE — PROGRESS NOTES
February 1, 2018: Lai Sims is a 47 year old male who is seen in f/u up for osteochondritis dissecans of right knee, possible loose body in setting of knee djd.  Onset one year ago of not being able to straighten right knee completely.  Denies any knee pain prior to this, other than soccer injury with swollen knee when he was young.  Drives truck.   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      EXAMINATION: MR KNEE RIGHT W/O CONTRAST  1/30/2018 1:19 PM      CLINICAL HISTORY: Knee pain     COMPARISON: Radiographs dated 1/26/2018     FINDINGS:      The ACL and posterior cruciate ligament are intact and unremarkable.  The medial collateral ligament is preserved. The lateral supporting  structures, including the iliotibial band, the lateral collateral  ligament, the biceps femoris and popliteal tendons are intact.     In the medial femorotibial joint compartment, the medial meniscus is  extruded and there is a small fragment of the anterior horn dislodged  into the coronary recess (series 5, image 12). Significant free edge  blunting of the body and posterior horn of the medial meniscus is  noted. There is high-grade cartilage loss involving the weightbearing  aspect of the medial femoral condyle with mild subcortical edema and  cystic changes due to a subtle subcortical insufficiency fracture  (coronal series 6, image 18). The articular cartilage of the tibial  plateau is markedly thinned.     In the lateral femorotibial joint compartment, there is high-grade  degenerative tearing of the meniscus. Marked mucoid degeneration  involving the anterior horn with its root attachment and the body of  the lateral meniscus with free edge fraying is noted. The body of the  lateral meniscus is extruded, the posterior root attachment appears  intact. Most importantly, there is full-thickness cartilage loss  involving the lateral tibial plateau with marked edema, subcortical  cystic changes and underlying subcortical  microtrabecular  insufficiency fractures. There are areas of focal full-thickness  cartilage loss of the posterior lateral femoral condyle.      The articular cartilage of the patellofemoral joint demonstrates near  full-thickness cartilage fissuring and loss about the median ridge and  medial patellar facet. There is associated bone marrow edema. An area  of focal cartilage fissuring and subchondral edema is noted about the  most proximal aspect of the lateral trochlea.     The extensor mechanism is intact. There is a small joint effusion.         IMPRESSION:   1. High-grade degenerative changes of the medial femorotibial joint  compartment with an extruded and degenerated meniscus. Subcortical  insufficiency fracture involving the weightbearing aspect of the  medial femoral condyle just deep to an area of near full-thickness  articular cartilage loss.  2. High-grade degenerative changes of the lateral femorotibial joint  compartment with an extruded and degenerated meniscus. Full-thickness  cartilage loss of the lateral tibial plateau with associated  subcortical microtrabecular insufficiency fractures.  3. High-grade articular cartilage loss about the median ridge and  medial patellar facet.  4. Small joint effusion.     JUTTA ELLERMANN, MD      Objective: He lacks full extension of the knee by about 10  compared to the opposite side. His opposite knee is able to achieve full extension. There is no intra-articular effusion but he seems to have some swelling in the popliteal space that is nontender and not warm to the touch. No signs of cellulitis. He's tender along the lateral joint line mildly tender along the medial joint line. Anterior posterior drawer is negative. Lachman's appears negative. His full range of motion at the hip. Nontender at the distal IT band or fibular head. Distal pulses and sensation are intact. Overlying skin is intact. Appropriate in conversation and affect.     An x-ray of the right  knee show significant degenerative joint disease in both the medial and lateral tibiofemoral spaces. Peripheral spurring is noted. I do not see any significant posterior spurring.   The MRI suggested degenerative change in both medial and lateral tibio femoral compartments but also did show a loose body fragment.       Assessment right knee degenerative joint disease with possible osteochondral defect or loose body     Plan: His x-rays do suggest the possibility of a previous osteochondral lesion. He may be dealing with a current loose body per MRI.  We discussed trying and empiric cortisone injection to see if it helps with pain.  Pt is concerned about the lack of knee extension for the past year.  I'll have him review the MRI and loose body with the surgeon in consultation.

## 2018-02-01 NOTE — MR AVS SNAPSHOT
After Visit Summary   2/1/2018    Lai Sims    MRN: [id number]           Patient Information     Date Of Birth          1970        Visit Information        Provider Department      2/1/2018 11:45 AM Dillan Monte MD; [employer] [facility]        Today's Diagnoses     Body, loose, knee, right    -  1       Follow-ups after your visit        Additional Services     ORTHOPEDICS ADULT REFERRAL       Your provider has referred you to: [facility]: Orthopaedic Clinic [facility] [phone]   [email or web address]      /Radha/Desmond    Loose body removal    Please be aware that coverage of these services is subject to the terms and limitations of your health insurance plan.  Call member services at your health plan with any benefit or coverage questions.      Please bring the following to your appointment:    >>   Any x-rays, CTs or MRIs which have been performed.  Contact the facility where they were done to arrange for  prior to your scheduled appointment.    >>   List of current medications   >>   This referral request   >>   Any documents/labs given to you for this referral                  Your next 10 appointments already scheduled     Mar 06, 2018 11:00 AM CST   (Arrive by 10:45 AM)   New Visit with Cisco Hoff MD   [facility] ([facility] [place])    [address] [place] [id number] [phone]              Who to contact     Please call your clinic at [phone] to:    Ask questions about your health    Make or cancel appointments    Discuss your medicines    Learn about your test results    Speak to your doctor   If you have compliments or concerns about an experience at your clinic, or if you wish to file a complaint, please contact [facility] Patient Relations at [phone] or email us at  "[email or web address]         Additional Information About Your Visit        TeakharDevotee Information     Zazzlet is an electronic gateway that provides easy, online access to your medical records. With Abeelo, you can request a clinic appointment, read your test results, renew a prescription or communicate with your care team.     To sign up for Abeelo visit the website at [email or web address]   You will be asked to enter the access code listed below, as well as some personal information. Please follow the directions to create your username and password.     Your access code is: [email or web address]  Expires: 2018 12:11 PM     Your access code will  in 90 days. If you need help or a new code, please contact your [facility] or call [phone] for assistance.        Care EveryWhere ID     This is your Care EveryWhere ID. This could be used by other organizations to access your [place] medical records  [id number]        Your Vitals Were     Respirations Height BMI (Body Mass Index)             16 5' 9\" (1.753 m) 28.21 kg/m2          Blood Pressure from Last 3 Encounters:   18 116/76   17 106/64   17 102/62    Weight from Last 3 Encounters:   18 191 lb (86.6 kg)   18 191 lb (86.6 kg)   18 191 lb 8 oz (86.9 kg)              We Performed the Following     ORTHOPEDICS ADULT REFERRAL        Primary Care Provider Office Phone # Fax #    Will Ashli Patterson [phone]       [address] [facility] [address]        Equal Access to Services     [facility]: Hadii melanie Dash, warubinda lukunaladaha, qaybta anaalalvarez barry . So [facility] [phone].    ATENCIÓN: Si habla español, tiene a chavira disposición servicios gratuitos de asistencia lingüística. Llame al [phone].    We comply with applicable federal civil rights laws and [place] laws. We do not " discriminate on the basis of race, color, national origin, age, disability, sex, sexual orientation, or gender identity.            Thank you!     Thank you for choosing [facility]  for your care. Our goal is always to provide you with excellent care. Hearing back from our patients is one way we can continue to improve our services. Please take a few minutes to complete the written survey that you may receive in the mail after your visit with us. Thank you!             Your Updated Medication List - Protect others around you: Learn how to safely use, store and throw away your medicines at www.disposemymeds.org.          This list is accurate as of 2/1/18 12:20 PM.  Always use your most recent med list.                   Brand Name Dispense Instructions for use Diagnosis    acetaminophen 500 MG tablet    MAPAP EXTRA STRENGTH    120 tablet    Take 1-2 tablets (500-1,000 mg) by mouth every 6 hours as needed for mild pain    Lumbar radiculopathy, Synovial cyst of right popliteal space, Primary osteoarthritis of right knee       cetirizine 10 MG tablet    zyrTEC    30 tablet    Take 1 tablet (10 mg) by mouth every evening    Lichen simplex chronicus       fluocinonide 0.05 % topical gel    LIDEX    60 g    Apply sparingly to affected area twice daily as needed.  Do not apply to face.    Lichen simplex chronicus       metroNIDAZOLE 500 MG tablet    FLAGYL    30 tablet    Take 1 tablet (500 mg) by mouth 2 times daily    H. pylori infection, Gastroesophageal reflux disease without esophagitis       mineral oil-white petrolatum Crea cream     454 g    Apply topically 2 times daily    Lichen simplex chronicus       omeprazole 20 MG CR capsule    priLOSEC    30 capsule    Take 1 capsule (20 mg) by mouth daily    H. pylori infection

## 2018-02-01 NOTE — LETTER
2/1/2018      RE: Lai Sims  [address] [address]  [facility] [address]       February 1, 2018: Lai Sims is a 47 year old male who is seen in f/u up for osteochondritis dissecans of right knee, possible loose body in setting of knee djd.  Onset one year ago of not being able to straighten right knee completely.  Denies any knee pain prior to this, other than soccer injury with swollen knee when he was young.  Drives truck.   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      EXAMINATION: MR KNEE RIGHT W/O CONTRAST  1/30/2018 1:19 PM      CLINICAL HISTORY: Knee pain     COMPARISON: Radiographs dated 1/26/2018     FINDINGS:      The ACL and posterior cruciate ligament are intact and unremarkable.  The medial collateral ligament is preserved. The lateral supporting  structures, including the iliotibial band, the lateral collateral  ligament, the biceps femoris and popliteal tendons are intact.     In the medial femorotibial joint compartment, the medial meniscus is  extruded and there is a small fragment of the anterior horn dislodged  into the coronary recess (series 5, image 12). Significant free edge  blunting of the body and posterior horn of the medial meniscus is  noted. There is high-grade cartilage loss involving the weightbearing  aspect of the medial femoral condyle with mild subcortical edema and  cystic changes due to a subtle subcortical insufficiency fracture  (coronal series 6, image 18). The articular cartilage of the tibial  plateau is markedly thinned.     In the lateral femorotibial joint compartment, there is high-grade  degenerative tearing of the meniscus. Marked mucoid degeneration  involving the anterior horn with its root attachment and the body of  the lateral meniscus with free edge fraying is noted. The body of the  lateral meniscus is extruded, the posterior root attachment appears  intact. Most importantly, there is full-thickness cartilage loss  involving the lateral tibial plateau with marked edema,  subcortical  cystic changes and underlying subcortical microtrabecular  insufficiency fractures. There are areas of focal full-thickness  cartilage loss of the posterior lateral femoral condyle.      The articular cartilage of the patellofemoral joint demonstrates near  full-thickness cartilage fissuring and loss about the median ridge and  medial patellar facet. There is associated bone marrow edema. An area  of focal cartilage fissuring and subchondral edema is noted about the  most proximal aspect of the lateral trochlea.     The extensor mechanism is intact. There is a small joint effusion.         IMPRESSION:   1. High-grade degenerative changes of the medial femorotibial joint  compartment with an extruded and degenerated meniscus. Subcortical  insufficiency fracture involving the weightbearing aspect of the  medial femoral condyle just deep to an area of near full-thickness  articular cartilage loss.  2. High-grade degenerative changes of the lateral femorotibial joint  compartment with an extruded and degenerated meniscus. Full-thickness  cartilage loss of the lateral tibial plateau with associated  subcortical microtrabecular insufficiency fractures.  3. High-grade articular cartilage loss about the median ridge and  medial patellar facet.  4. Small joint effusion.     JUTTA ELLERMANN, MD      Objective: He lacks full extension of the knee by about 10  compared to the opposite side. His opposite knee is able to achieve full extension. There is no intra-articular effusion but he seems to have some swelling in the popliteal space that is nontender and not warm to the touch. No signs of cellulitis. He's tender along the lateral joint line mildly tender along the medial joint line. Anterior posterior drawer is negative. Lachman's appears negative. His full range of motion at the hip. Nontender at the distal IT band or fibular head. Distal pulses and sensation are intact. Overlying skin is intact. Appropriate in  conversation and affect.     An x-ray of the right knee show significant degenerative joint disease in both the medial and lateral tibiofemoral spaces. Peripheral spurring is noted. I do not see any significant posterior spurring.   The MRI suggested degenerative change in both medial and lateral tibio femoral compartments but also did show a loose body fragment.       Assessment right knee degenerative joint disease with possible osteochondral defect or loose body     Plan: His x-rays do suggest the possibility of a previous osteochondral lesion. He may be dealing with a current loose body per MRI.  We discussed trying and empiric cortisone injection to see if it helps with pain.  Pt is concerned about the lack of knee extension for the past year.  I'll have him review the MRI and loose body with the surgeon in consultation.         Dillan Monte MD

## 2018-05-08 DIAGNOSIS — L28.0 LICHEN SIMPLEX CHRONICUS: ICD-10-CM

## 2018-05-08 NOTE — TELEPHONE ENCOUNTER
Requested Prescriptions   Pending Prescriptions Disp Refills     mineral oil-white petrolatum (MINERIN/EUCERIN) CREA cream [Pharmacy Med Name: MINERIN CREME]      Last Written Prescription Date:  11/29/16  Last Fill Quantity: 454 g,   # refills: 0  Last Office Visit: 1/26/18 Leonardo  Future Office visit:    Next 5 appointments (look out 90 days)     May 11, 2018 10:30 AM CDT   Office Visit with Will Patterson MD, [employer]   [facility]    [address] [place] [id number] [phone]                   Routing refill request to provider for review/approval because:  Drug not on the [facility], [facility] or  Health refill protocol or controlled substance   454 g 0     Sig: APPLY TOPICALLY TO AFFECTED AREA 2 TIMES DAILY    There is no refill protocol information for this order

## 2018-05-10 RX ORDER — LANOLIN ALCOHOL/MO/W.PET/CERES
CREAM (GRAM) TOPICAL
Qty: 454 G | Refills: 0 | Status: SHIPPED | OUTPATIENT
Start: 2018-05-10

## 2018-05-11 ENCOUNTER — OFFICE VISIT (OUTPATIENT)
Dept: FAMILY MEDICINE | Facility: CLINIC | Age: 48
End: 2018-05-11
Payer: COMMERCIAL

## 2018-05-11 VITALS
SYSTOLIC BLOOD PRESSURE: 110 MMHG | WEIGHT: 182 LBS | OXYGEN SATURATION: 100 % | RESPIRATION RATE: 16 BRPM | TEMPERATURE: 97.7 F | BODY MASS INDEX: 26.88 KG/M2 | HEART RATE: 72 BPM | DIASTOLIC BLOOD PRESSURE: 70 MMHG

## 2018-05-11 DIAGNOSIS — K21.9 GASTROESOPHAGEAL REFLUX DISEASE WITHOUT ESOPHAGITIS: ICD-10-CM

## 2018-05-11 DIAGNOSIS — Z13.6 CARDIOVASCULAR SCREENING; LDL GOAL LESS THAN 100: ICD-10-CM

## 2018-05-11 DIAGNOSIS — M17.11 PRIMARY OSTEOARTHRITIS OF RIGHT KNEE: Primary | ICD-10-CM

## 2018-05-11 DIAGNOSIS — Z12.5 SPECIAL SCREENING FOR MALIGNANT NEOPLASM OF PROSTATE: ICD-10-CM

## 2018-05-11 DIAGNOSIS — Z83.3 FAMILY HISTORY OF DIABETES MELLITUS: ICD-10-CM

## 2018-05-11 LAB
ERYTHROCYTE [DISTWIDTH] IN BLOOD BY AUTOMATED COUNT: 13.4 % (ref 10–15)
HCT VFR BLD AUTO: 47.3 % (ref 40–53)
HGB BLD-MCNC: 15.8 G/DL (ref 13.3–17.7)
MCH RBC QN AUTO: 30.7 PG (ref 26.5–33)
MCHC RBC AUTO-ENTMCNC: 33.4 G/DL (ref 31.5–36.5)
MCV RBC AUTO: 92 FL (ref 78–100)
PLATELET # BLD AUTO: 228 10E9/L (ref 150–450)
PSA SERPL-ACNC: 0.59 UG/L (ref 0–4)
RBC # BLD AUTO: 5.14 10E12/L (ref 4.4–5.9)
WBC # BLD AUTO: 8.6 10E9/L (ref 4–11)

## 2018-05-11 PROCEDURE — 85027 COMPLETE CBC AUTOMATED: CPT | Performed by: FAMILY MEDICINE

## 2018-05-11 PROCEDURE — G0103 PSA SCREENING: HCPCS | Performed by: FAMILY MEDICINE

## 2018-05-11 PROCEDURE — 80061 LIPID PANEL: CPT | Performed by: FAMILY MEDICINE

## 2018-05-11 PROCEDURE — 99214 OFFICE O/P EST MOD 30 MIN: CPT | Performed by: FAMILY MEDICINE

## 2018-05-11 PROCEDURE — 36415 COLL VENOUS BLD VENIPUNCTURE: CPT | Performed by: FAMILY MEDICINE

## 2018-05-11 PROCEDURE — 80048 BASIC METABOLIC PNL TOTAL CA: CPT | Performed by: FAMILY MEDICINE

## 2018-05-11 NOTE — PATIENT INSTRUCTIONS
AVOID KNEELING BENDING AND SQUATTING   AVOIDING RUNNING     KNEE EXERCISES        ICE TO KNEE 5 MINUTES PRIOR TO EXERCISE IF POSSIBLE    ISOMETRIC KNEE EXTENDED POSITION STANDING X 30 TWICE DAILY \    FLEXION OF KNEE ISOMETRIC 90 DEGREE FLEXION X 30 TWICE DAILY    WITH FIVE POUND WEIGHTS OR PURSE    SITTING EXTENDED KNEE  X 3O SECONDS TWICE DAILY    STANDING WITH KNEE FLEXED X 30 SECONDS TWICE DAILY    CLOSED CHAIN EXERCISE  ,THAT IS ONE 1-2 INCH BOOK 30 REPS ON AND OFF THE BOOK OR PLATFORM     AFTER 2 WEEK INCREASE TO 3 INCHES    AFTER 4 WEEKS INCREASE TO 4 INCHES    WALL SITTING 30 SECONDS 45 DEGREES    AFTER 2 WEEKS 30 SECONDS 60 DEGREES    AFTER  4 WEEKS 30 SECONDS 90 DEGREES    BALANCE 30 SECONDS WITH OPPOSITE LEG AT 30 DEGREES AND ARM TO SIDE X 2 WEEKS    BALANCE 30 SECONDS WITH OPPOSITE LEG AT 60 DEGREES AND ARM TO SIDE X 2 WEEKS    REPEAT with OPPOSITE LEGS BALANCING       A  (M17.11) Primary osteoarthritis of right knee  (primary encounter diagnosis)  Comment:    Plan: diclofenac (VOLTAREN) 1 % GEL topical gel FOUR TIMES DAILY   KIT MONTANEZ JR., MD

## 2018-05-11 NOTE — MR AVS SNAPSHOT
After Visit Summary   5/11/2018    Lai Sims    MRN: [id number]           Patient Information     Date Of Birth          1970        Visit Information        Provider Department      5/11/2018 10:30 AM Kit Montanez MD; [employer] [facility]        Today's Diagnoses     Primary osteoarthritis of right knee    -  1    CARDIOVASCULAR SCREENING; LDL GOAL LESS THAN 100        Family history of diabetes mellitus        Special screening for malignant neoplasm of prostate        Gastroesophageal reflux disease without esophagitis          Care Instructions    AVOID KNEELING BENDING AND SQUATTING   AVOIDING RUNNING     KNEE EXERCISES        ICE TO KNEE 5 MINUTES PRIOR TO EXERCISE IF POSSIBLE    ISOMETRIC KNEE EXTENDED POSITION STANDING X 30 TWICE DAILY \    FLEXION OF KNEE ISOMETRIC 90 DEGREE FLEXION X 30 TWICE DAILY    WITH FIVE POUND WEIGHTS OR PURSE    SITTING EXTENDED KNEE  X 3O SECONDS TWICE DAILY    STANDING WITH KNEE FLEXED X 30 SECONDS TWICE DAILY    CLOSED CHAIN EXERCISE  ,THAT IS ONE 1-2 INCH BOOK 30 REPS ON AND OFF THE BOOK OR PLATFORM     AFTER 2 WEEK INCREASE TO 3 INCHES    AFTER 4 WEEKS INCREASE TO 4 INCHES    WALL SITTING 30 SECONDS 45 DEGREES    AFTER 2 WEEKS 30 SECONDS 60 DEGREES    AFTER  4 WEEKS 30 SECONDS 90 DEGREES    BALANCE 30 SECONDS WITH OPPOSITE LEG AT 30 DEGREES AND ARM TO SIDE X 2 WEEKS    BALANCE 30 SECONDS WITH OPPOSITE LEG AT 60 DEGREES AND ARM TO SIDE X 2 WEEKS    REPEAT with OPPOSITE LEGS BALANCING       A  (M17.11) Primary osteoarthritis of right knee  (primary encounter diagnosis)  Comment:    Plan: diclofenac (VOLTAREN) 1 % GEL topical gel FOUR TIMES DAILY   KIT MONTANEZ JR., MD                          Follow-ups after your visit        Follow-up notes from your care team     Return in about 8 weeks (around 7/6/2018) for Physical Exam.      Who to contact     If you have questions or need  "follow up information about today's clinic visit or your schedule please contact [facility] directly at [phone].  Normal or non-critical lab and imaging results will be communicated to you by MyChart, letter or phone within 4 business days after the clinic has received the results. If you do not hear from us within 7 days, please contact the clinic through T[employer] or phone. If you have a critical or abnormal lab result, we will notify you by phone as soon as possible.  Submit refill requests through reportbrain or call your pharmacy and they will forward the refill request to us. Please allow 3 business days for your refill to be completed.          Additional Information About Your Visit        TicketForEvent[employer]run Information     reportbrain lets you send messages to your doctor, view your test results, renew your prescriptions, schedule appointments and more. To sign up, go to [email or web address] . Click on \"Log in\" on the left side of the screen, which will take you to the Welcome page. Then click on \"Sign up Now\" on the right side of the page.     You will be asked to enter the access code listed below, as well as some personal information. Please follow the directions to create your username and password.     Your access code is: [email or web address]  Expires: 2018 11:18 AM     Your access code will  in 90 days. If you need help or a new code, please call your [facility] clinic or [phone].        Care EveryWhere ID     This is your Care EveryWhere ID. This could be used by other organizations to access your [facility] medical records  [id number]        Your Vitals Were     Pulse Temperature Respirations Pulse Oximetry BMI (Body Mass Index)       72 97.7  F (36.5  C) (Tympanic) 16 100% 26.88 kg/m2        Blood Pressure from Last 3 Encounters:   18 110/70   18 116/76   17 106/64    Weight from Last 3 Encounters:   18 182 lb (82.6 kg)   18 191 lb (86.6 kg) "   01/30/18 191 lb (86.6 kg)              We Performed the Following     Basic metabolic panel     CBC with platelets     Lipid panel reflex to direct LDL Fasting     Prostate spec antigen screen          Today's Medication Changes          These changes are accurate as of 5/11/18 11:23 AM.  If you have any questions, ask your nurse or doctor.               Start taking these medicines.        Dose/Directions    diclofenac 1 % Gel topical gel   Commonly known as:  VOLTAREN   Used for:  Primary osteoarthritis of right knee   Started by:  Will Patterson MD        2 GRAM FOUR TIMES DAILY RIGHT KNEE   Quantity:  100 g   Refills:  11            Where to get your medicines      These medications were sent to [facility]/pharmacy #[id number] - [place], [place] - [address], [place] [address]     Phone:  [phone]     diclofenac 1 % Gel topical gel                Primary Care Provider Office Phone # Fax #    Will Patterson [phone] [address] [facility] [address]        Equal Access to Services     [facility] Center[facility]ILY AH: Hadii aad ku hadasho Soomaali, waaxda luqadaha, qaybta kaalmada adeegyada, waxay idiin hayaan adeeg kharageorge la'john . So [facility] [phone].    ATENCIÓN: Si habla español, tiene a chavira disposición servicios gratuitos de asistencia lingüística. Llame al [phone].    We comply with applicable federal civil rights laws and [place] laws. We do not discriminate on the basis of race, color, national origin, age, disability, sex, sexual orientation, or gender identity.            Thank you!     Thank you for choosing [facility]  for your care. Our goal is always to provide you with excellent care. Hearing back from our patients is one way we can continue to improve our services. Please take a few minutes to complete the written survey that you may receive in the mail after your visit with us. Thank you!              Your Updated Medication List - Protect others around you: Learn how to safely use, store and throw away your medicines at www.disposemymeds.org.          This list is accurate as of 5/11/18 11:23 AM.  Always use your most recent med list.                   Brand Name Dispense Instructions for use Diagnosis    acetaminophen 500 MG tablet    MAPAP EXTRA STRENGTH    120 tablet    Take 1-2 tablets (500-1,000 mg) by mouth every 6 hours as needed for mild pain    Lumbar radiculopathy, Synovial cyst of right popliteal space, Primary osteoarthritis of right knee       cetirizine 10 MG tablet    zyrTEC    30 tablet    Take 1 tablet (10 mg) by mouth every evening    Lichen simplex chronicus       diclofenac 1 % Gel topical gel    VOLTAREN    100 g    2 GRAM FOUR TIMES DAILY RIGHT KNEE    Primary osteoarthritis of right knee       fluocinonide 0.05 % topical gel    LIDEX    60 g    Apply sparingly to affected area twice daily as needed.  Do not apply to face.    Lichen simplex chronicus       metroNIDAZOLE 500 MG tablet    FLAGYL    30 tablet    Take 1 tablet (500 mg) by mouth 2 times daily    H. pylori infection, Gastroesophageal reflux disease without esophagitis       mineral oil-white petrolatum Crea cream     454 g    APPLY TOPICALLY TO AFFECTED AREA 2 TIMES DAILY    Lichen simplex chronicus       omeprazole 20 MG CR capsule    priLOSEC    30 capsule    Take 1 capsule (20 mg) by mouth daily    H. pylori infection

## 2018-05-11 NOTE — PROGRESS NOTES
SUBJECTIVE:   Lai Sims is a 47 year old male who presents to clinic today for the following health issues:      Musculoskeletal problem/pain      Duration: few months    Description  Location: right knee    Intensity:  Getting better    Accompanying signs and symptoms: yes    History  Previous similar problem: YES  Previous evaluation:  x-ray and orthopedic evaluation    Precipitating or alleviating factors:  Trauma or overuse: no   Aggravating factors include: running    Therapies tried and outcome: some kind of ointment helps    .      There are no preventive care reminders to display for this patient.          .  Current Outpatient Prescriptions   Medication Sig Dispense Refill     diclofenac (VOLTAREN) 1 % GEL topical gel 2 GRAM FOUR TIMES DAILY RIGHT KNEE 100 g 11     omeprazole (PRILOSEC) 20 MG CR capsule Take 1 capsule (20 mg) by mouth daily 30 capsule 11     acetaminophen (MAPAP EXTRA STRENGTH) 500 MG tablet Take 1-2 tablets (500-1,000 mg) by mouth every 6 hours as needed for mild pain (Patient not taking: Reported on 2/1/2018) 120 tablet 11     cetirizine (ZYRTEC) 10 MG tablet Take 1 tablet (10 mg) by mouth every evening (Patient not taking: Reported on 1/26/2018) 30 tablet 1     fluocinonide (LIDEX) 0.05 % topical gel Apply sparingly to affected area twice daily as needed.  Do not apply to face. (Patient not taking: Reported on 1/26/2018) 60 g 3     metroNIDAZOLE (FLAGYL) 500 MG tablet Take 1 tablet (500 mg) by mouth 2 times daily (Patient not taking: Reported on 1/26/2018) 30 tablet 0     mineral oil-white petrolatum (MINERIN/EUCERIN) CREA cream APPLY TOPICALLY TO AFFECTED AREA 2 TIMES DAILY (Patient not taking: Reported on 5/11/2018) 454 g 0              No Known Allergies      Immunization History   Administered Date(s) Administered     HepB 05/15/2014     MMR 03/20/2014, 05/02/2014     Pneumococcal 23 valent 03/20/2014     TDAP Vaccine (Boostrix) 03/20/2014, 05/02/2014, 02/12/2015      Varicella 2014, 2014               reports that he does not drink alcohol.          reports that he does not use illicit drugs.        family history includes DIABETES in his father; Hypertension in his mother; Liver Disease in his father.        indicated that his mother is . He indicated that his father is .          has no past surgical history on file.         reports that he currently engages in sexual activity and has had female partners.    .  Pediatric History   Patient Guardian Status     Not on file.     Other Topics Concern     Parent/Sibling W/ Cabg, Mi Or Angioplasty Before 65f 55m? No     Social History Narrative               reports that he has been smoking Cigarettes.  He has never used smokeless tobacco.        Medical, social, surgical, and family histories reviewed.        Labs reviewed in EPIC  Patient Active Problem List   Diagnosis     Family history of diabetes mellitus     CARDIOVASCULAR SCREENING; LDL GOAL LESS THAN 100     Nonspecific abnormal results of liver function study     H. pylori infection     Lichen simplex chronicus     Primary osteoarthritis of right knee     Synovial cyst of right popliteal space     Lumbar radiculopathy         History reviewed. No pertinent surgical history.    Social History   Substance Use Topics     Smoking status: Current Every Day Smoker     Types: Cigarettes     Smokeless tobacco: Never Used     Alcohol use No       Family History   Problem Relation Age of Onset     Hypertension Mother      DIABETES Father      Liver Disease Father              Current Outpatient Prescriptions   Medication Sig Dispense Refill     diclofenac (VOLTAREN) 1 % GEL topical gel 2 GRAM FOUR TIMES DAILY RIGHT KNEE 100 g 11     omeprazole (PRILOSEC) 20 MG CR capsule Take 1 capsule (20 mg) by mouth daily 30 capsule 11     acetaminophen (MAPAP EXTRA STRENGTH) 500 MG tablet Take 1-2 tablets (500-1,000 mg) by mouth every 6 hours as needed for mild pain  (Patient not taking: Reported on 2/1/2018) 120 tablet 11     cetirizine (ZYRTEC) 10 MG tablet Take 1 tablet (10 mg) by mouth every evening (Patient not taking: Reported on 1/26/2018) 30 tablet 1     fluocinonide (LIDEX) 0.05 % topical gel Apply sparingly to affected area twice daily as needed.  Do not apply to face. (Patient not taking: Reported on 1/26/2018) 60 g 3     metroNIDAZOLE (FLAGYL) 500 MG tablet Take 1 tablet (500 mg) by mouth 2 times daily (Patient not taking: Reported on 1/26/2018) 30 tablet 0     mineral oil-white petrolatum (MINERIN/EUCERIN) CREA cream APPLY TOPICALLY TO AFFECTED AREA 2 TIMES DAILY (Patient not taking: Reported on 5/11/2018) 454 g 0           Recent Labs   Lab Test  09/19/16   1652   LDL  94   HDL  38*   TRIG  191*   ALT  75*            BP Readings from Last 6 Encounters:   05/11/18 110/70   01/26/18 116/76   06/20/17 106/64   01/02/17 102/62   11/29/16 108/64   09/19/16 108/62           Wt Readings from Last 3 Encounters:   05/11/18 182 lb (82.6 kg)   02/01/18 191 lb (86.6 kg)   01/30/18 191 lb (86.6 kg)                 Positive symptoms or findings indicated by bold designation:         ROS: 10 point ROS neg other than the symptoms noted above in the HPI.except  has Family history of diabetes mellitus; CARDIOVASCULAR SCREENING; LDL GOAL LESS THAN 100; Nonspecific abnormal results of liver function study; H. pylori infection; Lichen simplex chronicus; Primary osteoarthritis of right knee; Synovial cyst of right popliteal space; and Lumbar radiculopathy on his problem list.  Review Of Systems    Skin: negative    Eyes: negative    Ears/Nose/Throat: negative    Respiratory: No shortness of breath, dyspnea on exertion, cough, or hemoptysis    Cardiovascular: negative    Gastrointestinal: negative HISTORY OF LIVER FUNCTION TEST     Genitourinary: negative    Musculoskeletal: back pain    Neurologic: negative    Psychiatric: negative    Hematologic/Lymphatic/Immunologic:  negative    Endocrine: negative                PE:  /70 (Cuff Size: Adult Large)  Pulse 72  Temp 97.7  F (36.5  C) (Tympanic)  Resp 16  Wt 182 lb (82.6 kg)  SpO2 100%  BMI 26.88 kg/m2 Body mass index is 26.88 kg/(m^2).        Constitutional: general appearance, well nourished, well developed, in no acute distress, well developed, appears stated age, normal body habitus,          Eyes:; The patient has normal eyelids sclerae and conjunctivae :          Ears/Nose/Throat: external ear, overall: normal appearance; external nose, overall: benign appearance, normal moujth gums and lips           Neck: thyroid, overall: normal size, normal consistency, nontender,          Respiratory:  palpation of chest, overall: normal excursion,    Clear to percussion and auscultation     NO Tachypnea    NORMAL  Color          Cardiovascular:  Good color with no peripheral edema    Regular sinus rhythm without murmur.  Physiologic heart sounds   Heart is unelarged    .     Chest/Breast: normal shape           Abdominal exam,  Liver and spleen are  unenlarged        Tenderness    Scars              Urogenital; no renal, flank or bladder  tenderness;          Lymphatic: neck nodes,     Other nodes         Musculoskeletal:  Brief ortho exam normal except:           Integument: inspection of skin, no rash, lesions; and, palpation, no induration, no tenderness.          Neurologic mental status, overall: alert and oriented; gait, no ataxia, no unsteadiness; coordination, no tremors; cranial nerves, overall: normal motor, overall: normal bulk, tone.          Psychiatric: orientation/consciousness, overall: oriented to person, place and time; behavior/psychomotor activity, no tics, normal psychomotor activity; mood and affect, overall: normal mood and affect; appearance, overall: well-groomed, good eye contact; speech, overall: normal quality, no aphasia and normal quality, quantity, intact.        Diagnostic Test Results:  Results  for orders placed or performed in visit on 05/11/18   CBC with platelets   Result Value Ref Range    WBC 8.6 4.0 - 11.0 10e9/L    RBC Count 5.14 4.4 - 5.9 10e12/L    Hemoglobin 15.8 13.3 - 17.7 g/dL    Hematocrit 47.3 40.0 - 53.0 %    MCV 92 78 - 100 fl    MCH 30.7 26.5 - 33.0 pg    MCHC 33.4 31.5 - 36.5 g/dL    RDW 13.4 10.0 - 15.0 %    Platelet Count 228 150 - 450 10e9/L           ICD-10-CM    1. Primary osteoarthritis of right knee M17.11 diclofenac (VOLTAREN) 1 % GEL topical gel   2. CARDIOVASCULAR SCREENING; LDL GOAL LESS THAN 100 Z13.6 Lipid panel reflex to direct LDL Fasting   3. Family history of diabetes mellitus Z83.3 Basic metabolic panel   4. Special screening for malignant neoplasm of prostate Z12.5 Prostate spec antigen screen   5. Gastroesophageal reflux disease without esophagitis K21.9 CBC with platelets              .    Side effects benefits and risks thoroughly discussed. .he may come in early if unimproved or getting worse          Please drink 2 glasses of water prior to meals and walk 15-30 minutes after meals        I spent  25 MINUTES SPENT  with patient discussing the following issues   The primary encounter diagnosis was Primary osteoarthritis of right knee. Diagnoses of CARDIOVASCULAR SCREENING; LDL GOAL LESS THAN 100, Family history of diabetes mellitus, Special screening for malignant neoplasm of prostate, and Gastroesophageal reflux disease without esophagitis were also pertinent to this visit. over half of which involved counseling and coordination of care.      Patient Instructions   AVOID KNEELING BENDING AND SQUATTING   AVOIDING RUNNING     KNEE EXERCISES        ICE TO KNEE 5 MINUTES PRIOR TO EXERCISE IF POSSIBLE    ISOMETRIC KNEE EXTENDED POSITION STANDING X 30 TWICE DAILY \    FLEXION OF KNEE ISOMETRIC 90 DEGREE FLEXION X 30 TWICE DAILY    WITH FIVE POUND WEIGHTS OR PURSE    SITTING EXTENDED KNEE  X 3O SECONDS TWICE DAILY    STANDING WITH KNEE FLEXED X 30 SECONDS TWICE  DAILY    CLOSED CHAIN EXERCISE  ,THAT IS ONE 1-2 INCH BOOK 30 REPS ON AND OFF THE BOOK OR PLATFORM     AFTER 2 WEEK INCREASE TO 3 INCHES    AFTER 4 WEEKS INCREASE TO 4 INCHES    WALL SITTING 30 SECONDS 45 DEGREES    AFTER 2 WEEKS 30 SECONDS 60 DEGREES    AFTER  4 WEEKS 30 SECONDS 90 DEGREES    BALANCE 30 SECONDS WITH OPPOSITE LEG AT 30 DEGREES AND ARM TO SIDE X 2 WEEKS    BALANCE 30 SECONDS WITH OPPOSITE LEG AT 60 DEGREES AND ARM TO SIDE X 2 WEEKS    REPEAT with OPPOSITE LEGS BALANCING       A  (M17.11) Primary osteoarthritis of right knee  (primary encounter diagnosis)  Comment:    Plan: diclofenac (VOLTAREN) 1 % GEL topical gel FOUR TIMES DAILY   KIT MONTANEZ JR., MD                            ALL THE ABOVE PROBLEMS ARE STABLE AND MED CHANGES AS NOTED        Diet: MEDITERRANEAN DIET         Exercise:  KNEE PAIN   Exercises Range of motion, balance, isometric, and strengthening exercises 30 repetitions twice daily of involved joints            .KIT MONTANEZ MD 5/11/2018 1:56 PM  May 11, 2018

## 2018-05-12 LAB
ANION GAP SERPL CALCULATED.3IONS-SCNC: 5 MMOL/L (ref 3–14)
BUN SERPL-MCNC: 15 MG/DL (ref 7–30)
CALCIUM SERPL-MCNC: 9.2 MG/DL (ref 8.5–10.1)
CHLORIDE SERPL-SCNC: 111 MMOL/L (ref 94–109)
CHOLEST SERPL-MCNC: 172 MG/DL
CO2 SERPL-SCNC: 30 MMOL/L (ref 20–32)
CREAT SERPL-MCNC: 1.15 MG/DL (ref 0.66–1.25)
GFR SERPL CREATININE-BSD FRML MDRD: 68 ML/MIN/1.7M2
GLUCOSE SERPL-MCNC: 84 MG/DL (ref 70–99)
HDLC SERPL-MCNC: 27 MG/DL
LDLC SERPL CALC-MCNC: 102 MG/DL
NONHDLC SERPL-MCNC: 145 MG/DL
POTASSIUM SERPL-SCNC: 4.3 MMOL/L (ref 3.4–5.3)
SODIUM SERPL-SCNC: 146 MMOL/L (ref 133–144)
TRIGL SERPL-MCNC: 217 MG/DL

## 2023-10-11 NOTE — LETTER
May 11, 2018      Lai ZACHARY Sims  [address] [address]  [facility] [address]        Dear ,    We are writing to inform you of your test results.    NORMAL COMPLETE BLOOD PANEL WBCS RBCS AND PLATELETS     Resulted Orders   CBC with platelets   Result Value Ref Range    WBC 8.6 4.0 - 11.0 10e9/L    RBC Count 5.14 4.4 - 5.9 10e12/L    Hemoglobin 15.8 13.3 - 17.7 g/dL    Hematocrit 47.3 40.0 - 53.0 %    MCV 92 78 - 100 fl    MCH 30.7 26.5 - 33.0 pg    MCHC 33.4 31.5 - 36.5 g/dL    RDW 13.4 10.0 - 15.0 %    Platelet Count 228 150 - 450 10e9/L       If you have any questions or concerns, please call the clinic at the number listed above.       Sincerely,        KIT MONTANEZ MD   Keystone Flap Text: The defect edges were debeveled with a #15 scalpel blade.  Given the location of the defect, shape of the defect a keystone flap was deemed most appropriate.  Using a sterile surgical marker, an appropriate keystone flap was drawn incorporating the defect, outlining the appropriate donor tissue and placing the expected incisions within the relaxed skin tension lines where possible. The area thus outlined was incised deep to adipose tissue with a #15 scalpel blade.  The skin margins were undermined to an appropriate distance in all directions around the primary defect and laterally outward around the flap utilizing iris scissors.